# Patient Record
Sex: FEMALE | HISPANIC OR LATINO | Employment: UNEMPLOYED | URBAN - METROPOLITAN AREA
[De-identification: names, ages, dates, MRNs, and addresses within clinical notes are randomized per-mention and may not be internally consistent; named-entity substitution may affect disease eponyms.]

---

## 2023-08-07 ENCOUNTER — OFFICE VISIT (OUTPATIENT)
Dept: OBGYN CLINIC | Facility: CLINIC | Age: 23
End: 2023-08-07

## 2023-08-07 VITALS
SYSTOLIC BLOOD PRESSURE: 125 MMHG | DIASTOLIC BLOOD PRESSURE: 83 MMHG | BODY MASS INDEX: 32.74 KG/M2 | WEIGHT: 208.6 LBS | HEIGHT: 67 IN | HEART RATE: 95 BPM

## 2023-08-07 DIAGNOSIS — N92.6 MISSED MENSES: ICD-10-CM

## 2023-08-07 DIAGNOSIS — Z34.93 PRENATAL CARE IN THIRD TRIMESTER: Primary | ICD-10-CM

## 2023-08-07 DIAGNOSIS — Z3A.29 29 WEEKS GESTATION OF PREGNANCY: ICD-10-CM

## 2023-08-07 PROCEDURE — 99203 OFFICE O/P NEW LOW 30 MIN: CPT | Performed by: NURSE PRACTITIONER

## 2023-08-07 RX ORDER — VITAMIN A ACETATE, .BETA.-CAROTENE, ASCORBIC ACID, CHOLECALCIFEROL, .ALPHA.-TOCOPHEROL ACETATE, DL-, THIAMINE MONONITRATE, RIBOFLAVIN, NIACINAMIDE, PYRIDOXINE HYDROCHLORIDE, FOLIC ACID, CYANOCOBALAMIN, CALCIUM CARBONATE, FERROUS FUMARATE, ZINC OXIDE, CUPRIC OXIDE 9.9; 120; 920; 200; 400; 2; 12; 27; 1; 20; 10; 3; 1.84; 3080; 25 MG/1; MG/1; [IU]/1; MG/1; [IU]/1; MG/1; UG/1; MG/1; MG/1; MG/1; MG/1; MG/1; MG/1; [IU]/1; MG/1
1 TABLET, FILM COATED ORAL DAILY
Qty: 90 TABLET | Refills: 4 | Status: SHIPPED | OUTPATIENT
Start: 2023-08-07

## 2023-08-07 NOTE — PATIENT INSTRUCTIONS
Dong por garber confianza en nuestro equipo. Dannial Parish y agradecemos amelie comentarios. Si recibe everett encuesta nuestra, tómese unos momentos para informarnos cómo estamos. [address]       El Tercer Trimestre  (28-42 semanas)   GARBER BEBÉ   ·        garber bebé chupa garber dedo ahora! ·        garber bebé puede oír voces y responder al tacto. .. habla con Clemon See!!   ·        el cerebro de garber bebé crece y se desarrolla más en los últimos 2 meses de embarazo   ·        Arville Vogel y [place] vinicio del bebé son Demetra Gamma y flexibles para que quepan por el canal del parto   ·        los movimientos del bebé cambian hacia el final del embarazo porque hay menos espacio para patear y estiramientos en tu vientre   ·        los pulmones del bebé no están completamente desarrollados y totalmente preparados para respirar por amelie propios hasta el último 3-4 semanas antes de garber fecha de vencimiento     TU CUERPO   ·        garber vientre está creciendo mucho ahora   ·        será más difícil dormir esmer de noche o ser tan activos chico eres generalmente   ·        puede sudar más de lo habitual   ·        serás más desequilibrado. .. tenga cuidado de no caer! ·        Usted puede desarrollar hemorroides (qué pueden ser dolorosas y hacen difícil sentarse)   ·        los dos últimos meses del embarazo puede ser muy incómodos con wally de [place], wally de Arville Vogel y ardor de estómago   ·        Puedes empezar a tener contracciones. .. mientras son irregulares y [place] de 5 por hora, esto es everett parte normal de garber cuerpo a punto de tener un bebé   ·        el alicia uterino puede empezar a dilatar y abrir [place]. .. para estar listo para el parto   ·        Usted puede encontrarse que necesitan hacer orinar muy a menudo. .. porque el bebé está presionando mucho la vejiga   ·        Usted puede quedarse corta de respiracion más rápido de lo feliz          CUENTAS DEL RETROCESO FETAL    En el tercer trimestre (después de 28 semanas de gestación) deben realizar patada fetal cuentas cada día. [place] bebé debe moverse al menos 10 veces en 2 horas scotty un tiempo [place], everett vez al día. Elegir el atime del día cuando el bebé es [place]. Trate de hacerlo a la misma hora cada día. Entrar en everett posición cómoda y luego escribir el tiempo que tu bebé se mueve sherri. Cuenta cada movimiento hasta que el bebé se mueve 10 veces. Estos movimientos incluyen patadas, puñetazos, codazos, revolotea o rollos. [place] puede tardar entre 5 minutos a 2 horas. Anote el tiempo que sientes movimiento 10 del bebé. Si ha pasado 2 horas y tu bebé no se ha movido al menos 10 veces, usted debe llamar a la oficina de inmediato. [facility] a [phone]:  [place] que llamar inmediatamente si tengo incluso everett pequeña cantidad de LIQUIDO de mi vagina, con o sin contracciones. * Tengo que llamar si estoy SANGRADO de mi vagina. * Tengo que llamar si tengo COLICOS que continúa después de beber 2 ó 3 vasos de agua y [place] en mi lado scotty everett hora y que se siente chico que estoy teniendo un período. * Tengo que llamar si siento CONTRACCIONES más de 4 veces en everett hora quando siento que mi maryan se mantiene dura después de que trato de beber 2-3 vasos del agua y [place] en mi lado scotty everett hora. * Tengo que llamar si сергей un cambio de mi FLUJO vaginal.   * Tengo que llamar si siento la [place] que siente que el bebé aprieta en mi vagina y dura más de [place]. * Tengo que llamar si tengo DOLOR BAJO DE LA ESPALDA que es nueva y está cerca de mi cóccix. Puede entrar y salir varias veces scotty everett hora o quedarse allí constantemente. LA PRE-ECLAMPSIA    ¿Qué es? La preeclampsia es everett enfermedad grave que puede ocurrir scotty el embarazo se relaciona con la presión arterial heriberto. Le puede pasar a cualquier brandi. ¿Por qué Yes importarme?  [place] preeclampsia tienen riesgos graves pueden incluir convulsiones, trazo, daños [employer], nacimiento prematuro de madsen bebé. En los [employer] [place], puede causar la muerte de la madre y madsen bebé. ¿Qué jordy pagar Larance Lacey? Signos y síntomas de la preeclampsia pueden incluir:   * Inflamación severa dimple de la roxann o las   * Dolor de teodoro todavía presente después de josey tomado Tylenol   * Effie manchas o cambios en [place]   * Dolor en la parte superior del abdomen o hombro   * [place] náuseas y vómitos (en la segunda mitad del embarazo)   * Aumento de peso repentino   * Dificultad para respirar     ¿Qué jordy hacer? Si usted experimenta alguno de los síntomas de la preeclampsia, comuníquese con madsen proveedor de [place]. Encontrar la preeclampsia temprana es importante para usted y madsen bebé. Elpidio nicole [phone].           LACTANCIA MATERNA     BENEFICIOS PARA LOS BEBÉS   ·       sistemas inmunológicos más marely (menos alergias, eczema, asma y cáncer infantil)   ·       menos diarrea y estreñimiento u otras enfermedades GI   ·       menos resfriados e infecciones del oído   ·       mejor visión y dientes (menos cavidades)   ·       mejora el IQ   ·       menores tasas de diabetes y obesidad en la infancia     BENEFICIOS PARA LAS MADRES   ·        promueve la pérdida de peso más rápida después del parto   ·        daina riesgo para la depresión postparto   ·        daina riesgo para los cánceres de [place], útero y ovario   ·        daina riesgo para la osteoporosis en desarrollo con la edad   ·        siempre es más fácil que fórmula - correcto, limpio, y la temperatura adecuada   ·        menos costosa que la fórmula es gratis!!!     CLAVES PARA SHONNA LACTANCIA EXITOSA   ·        mantener bebé piel a piel hasta después de la primera alimentación evento   ·        tener a bebé en madsen cuarto con usted scotty madsen estadía en el hospital después del parto   ·        evitar cualquier botella de alimentación (a menos que sea médicamente necesario)   ·        limitar el uso de chupones y pañales   ·        Pida ayuda si usted está teniendo problemas. .. consultores de lactancia (que se especializan en la lactancia) están disponibles para ayudarle a   ·        everett dieta saludable para mamá. .. comiendo everett variedad de alimentos y raciones con moderación     COSAS QUE DEBES SABER SOBRE LA LACTANCIA   ·        mayoría de los medicamentos se considera compatible con la lactancia materna por [address] [place] consultarlo con madsen médico o en lactancia antes de elinor un medicamento nuevo. .. para estar seguro es seguro   ·        alcohol (cerveza, vino, licor) puede transmitirse de la madre al bebé a través de la [place]. .. un ocasional, social bebida es considerado aceptable por [facility]. .. más que eso deben evitarse   ·        la lactancia materna es NO es un método para evitar embarazo   ·        nicotina (cigarrillos) puede pasar de la madre al bebé a través de la [place]. .. sin embargo, para las [employer], es aún más saludable para amamantar que use la fórmula   ·        cafeína debería limitarse a 1-3 tazas por día. .. incluye café, refrescos, bebidas energéticas           MASAJE EN LA CLEM PERINEAL O VAGINAL    Que puedo hacer ahora para reducir las probabilidades de desgarro scotty el parto? Masaje alrededor del orificio de la vagina realizado por usted misma (o por madsen ty). El masaje en esta clem, ya sea antes del parto o scotty la segunda etapa del parto, [place] reducir la probabilidad de desgarro perineal scotty el parto. Asimismo, el uso de compresas tibias en el perineo scotty la etapa expulsiva del parto puede reducir la pravedad del desparro. [place] sucedera scotty la etapa expulsiva del parto. En casa tambien puede reducir las probabilidades de sufrir lesiones durnate el parto de con masajes en la clem perineal.    Lito Kimble?   [address] [place] [address] embarazadas a partir de, [address], las 34 semanas de [place]. Cuando? Usted o madsen ty deben hacer masajes en la yeimy vaginal marielena 5 a 10 minutos de 1 a 4 veces por semana. Tuan? Use everett mezcla de agua y aceite de [place], kumar u shields con 1 o 2 dedos (lady la comodidad). Inserte los dedos en la vagina entre 3 y 5cm. Aplique presion general hacia abajo y [employer] costados marielena 5 a [address] 3 y las 9 horas, de 1 a 4 veces por semana. SENALES MARIELENA EL EMBARAZO  Llame a nuestra oficina al [phone] para cualquiera de los siguientes:    1. Sangrado vaginal  2. Dolor abdominal dolores que no desaparece. 3. Fiebre (más de 100.4 y no se cyrus con Tylenol)  4. Vómitos persistentes que case más de 24 horas. 5. dolor de pecho  6. Dolor o ardor al orinar. 7. Dolor de teodoro severo que no se resuelve con Tylenol  8. Visión borrosa o tre puntos en madsen visión. 9. Hinchazón repentina de madsen roxann o dimple. 10. Enrojecimiento, hinchazón o dolor en everett pierna. 11. Un aumento de peso repentino en pocos días. 12. Contar los movimientos fetales del bebé. (después de 28 semanas o el sexto mes de embarazo)  15. Eve[place] pérdida de líquido acuoso de la vagina: puede ser un chorro, un goteo o everett humedad continua  14.  Después de 20 semanas de embarazo, calambres rítmicos (más de 4 por hora) o menstruales tuan dolor Raymond Balamanda / Hortencia Prier

## 2023-08-07 NOTE — PROGRESS NOTES
PROBLEM GYNECOLOGICAL VISIT    Alexi Schneider is a 21 y.o. female who presents today with complaint of pregnancy. Her general medical history has been reviewed and she reports it as follows:    Past Medical History:   Diagnosis Date   • No known health problems      Past Surgical History:   Procedure Laterality Date   • NO PAST SURGERIES       OB History        4    Para   2    Term   2       0    AB   1    Living   2       SAB   1    IAB   0    Ectopic   0    Multiple   0    Live Births   2               Social History     Tobacco Use   • Smoking status: Never   • Smokeless tobacco: Never   Vaping Use   • Vaping Use: Never used   Substance Use Topics   • Alcohol use: Not Currently     Comment: couple times/month, none since pregnant   • Drug use: Never     Social History     Substance and Sexual Activity   Sexual Activity Yes   • Partners: Male   • Birth control/protection: None       Current Outpatient Medications   Medication Instructions   • Prenatal Vit-Fe Fumarate-FA (Prenatal Plus Vitamin/Mineral) 27-1 MG TABS 1 tablet, Oral, Daily       History of Present Illness:   Reports LMP of 2023. States she has just relocated here from the [facility] where she has had no prenatal care so far. She denies any vaginal bleeding since 2023. Denies any abdominal/pelvic pain or uterine contractions. Denies vaginal leaking of fluid. Reports much fetal movement. States she went to [address] [place] [address] earlier today to start prenatal care and they did an ultrasound and bloodwork and told her she is 29 weeks pregnant but she cancelled all subsequent care with them once she heard about our program from her friend and came directly here to initiate prenatal care. She brings documentation that she was given Tdap at [address] [place] [address] today. Review of Systems:  Review of Systems   Constitutional: Negative. Gastrointestinal: Negative.     Genitourinary: Negative for pelvic pain, vaginal bleeding, vaginal discharge and vaginal pain. + fetal movement       Physical Exam:  /83   Pulse 95   Ht 5' 7" (1.702 m)   Wt 94.6 kg (208 lb 9.6 oz)   LMP 01/13/2023 (Exact Date)   BMI 32.67 kg/m²   Physical Exam  Constitutional:       General: She is not in acute distress. Abdominal:      Palpations: Abdomen is soft. Tenderness: There is no abdominal tenderness. Comments: Gravid uterus with fundal ht=31cm and palpable gross fetal movement and FHR via doppler of 128   Neurological:      Mental Status: She is alert. Skin:     General: Skin is warm and dry. Vitals reviewed. Assessment:   1. Pregnancy @29w3d. Plan:   1. Referral ordered for MFM consultation/ultrasound to confirm dating and perform fetal anatomy screen. 2. Bloodwork ordered: prenatal labwork. 3. Return to office within 1 week for OB intake visit. 4. Patient's depression screening was assessed with a PHQ-2 score of 1. Their PHQ-9 score was 3. Clinically patient does not have depression. No treatment is required.

## 2023-08-08 ENCOUNTER — APPOINTMENT (OUTPATIENT)
Dept: LAB | Facility: HOSPITAL | Age: 23
End: 2023-08-08

## 2023-08-08 DIAGNOSIS — Z34.93 PRENATAL CARE IN THIRD TRIMESTER: ICD-10-CM

## 2023-08-08 PROBLEM — O99.019 MATERNAL IRON DEFICIENCY ANEMIA COMPLICATING PREGNANCY: Status: ACTIVE | Noted: 2023-08-08

## 2023-08-08 PROBLEM — D50.9 MATERNAL IRON DEFICIENCY ANEMIA COMPLICATING PREGNANCY: Status: ACTIVE | Noted: 2023-08-08

## 2023-08-08 LAB
ABO GROUP BLD: NORMAL
BACTERIA UR QL AUTO: ABNORMAL /HPF
BASOPHILS # BLD AUTO: 0.02 THOUSANDS/ÂΜL (ref 0–0.1)
BASOPHILS NFR BLD AUTO: 0 % (ref 0–1)
BILIRUB UR QL STRIP: NEGATIVE
BLD GP AB SCN SERPL QL: NEGATIVE
CLARITY UR: ABNORMAL
COLOR UR: ABNORMAL
EOSINOPHIL # BLD AUTO: 0.24 THOUSAND/ÂΜL (ref 0–0.61)
EOSINOPHIL NFR BLD AUTO: 3 % (ref 0–6)
ERYTHROCYTE [DISTWIDTH] IN BLOOD BY AUTOMATED COUNT: 12.5 % (ref 11.6–15.1)
GLUCOSE 1H P 50 G GLC PO SERPL-MCNC: 129 MG/DL (ref 40–134)
GLUCOSE UR STRIP-MCNC: NEGATIVE MG/DL
HCT VFR BLD AUTO: 33.3 % (ref 34.8–46.1)
HGB BLD-MCNC: 10.7 G/DL (ref 11.5–15.4)
HGB UR QL STRIP.AUTO: NEGATIVE
IMM GRANULOCYTES # BLD AUTO: 0.09 THOUSAND/UL (ref 0–0.2)
IMM GRANULOCYTES NFR BLD AUTO: 1 % (ref 0–2)
KETONES UR STRIP-MCNC: NEGATIVE MG/DL
LEUKOCYTE ESTERASE UR QL STRIP: 500
LYMPHOCYTES # BLD AUTO: 1.59 THOUSANDS/ÂΜL (ref 0.6–4.47)
LYMPHOCYTES NFR BLD AUTO: 17 % (ref 14–44)
MCH RBC QN AUTO: 28.9 PG (ref 26.8–34.3)
MCHC RBC AUTO-ENTMCNC: 32.1 G/DL (ref 31.4–37.4)
MCV RBC AUTO: 90 FL (ref 82–98)
MONOCYTES # BLD AUTO: 0.43 THOUSAND/ÂΜL (ref 0.17–1.22)
MONOCYTES NFR BLD AUTO: 5 % (ref 4–12)
NEUTROPHILS # BLD AUTO: 6.88 THOUSANDS/ÂΜL (ref 1.85–7.62)
NEUTS SEG NFR BLD AUTO: 74 % (ref 43–75)
NITRITE UR QL STRIP: NEGATIVE
NON-SQ EPI CELLS URNS QL MICRO: ABNORMAL /HPF
NRBC BLD AUTO-RTO: 0 /100 WBCS
PH UR STRIP.AUTO: 7 [PH]
PLATELET # BLD AUTO: 289 THOUSANDS/UL (ref 149–390)
PMV BLD AUTO: 10 FL (ref 8.9–12.7)
PROT UR STRIP-MCNC: NEGATIVE MG/DL
RBC # BLD AUTO: 3.7 MILLION/UL (ref 3.81–5.12)
RBC #/AREA URNS AUTO: ABNORMAL /HPF
RH BLD: POSITIVE
RUBV IGG SERPL IA-ACNC: 106.1 IU/ML
SP GR UR STRIP.AUTO: 1.01 (ref 1–1.04)
SPECIMEN EXPIRATION DATE: NORMAL
UROBILINOGEN UA: NEGATIVE MG/DL
WBC # BLD AUTO: 9.25 THOUSAND/UL (ref 4.31–10.16)
WBC #/AREA URNS AUTO: ABNORMAL /HPF

## 2023-08-08 PROCEDURE — 86901 BLOOD TYPING SEROLOGIC RH(D): CPT

## 2023-08-08 PROCEDURE — 86706 HEP B SURFACE ANTIBODY: CPT

## 2023-08-08 PROCEDURE — 86780 TREPONEMA PALLIDUM: CPT

## 2023-08-08 PROCEDURE — 85025 COMPLETE CBC W/AUTO DIFF WBC: CPT

## 2023-08-08 PROCEDURE — 86803 HEPATITIS C AB TEST: CPT

## 2023-08-08 PROCEDURE — 81001 URINALYSIS AUTO W/SCOPE: CPT | Performed by: NURSE PRACTITIONER

## 2023-08-08 PROCEDURE — 87340 HEPATITIS B SURFACE AG IA: CPT

## 2023-08-08 PROCEDURE — 83020 HEMOGLOBIN ELECTROPHORESIS: CPT

## 2023-08-08 PROCEDURE — 86850 RBC ANTIBODY SCREEN: CPT

## 2023-08-08 PROCEDURE — 86900 BLOOD TYPING SEROLOGIC ABO: CPT

## 2023-08-08 PROCEDURE — 87086 URINE CULTURE/COLONY COUNT: CPT

## 2023-08-08 PROCEDURE — 36415 COLL VENOUS BLD VENIPUNCTURE: CPT

## 2023-08-08 PROCEDURE — 82950 GLUCOSE TEST: CPT

## 2023-08-08 PROCEDURE — 87389 HIV-1 AG W/HIV-1&-2 AB AG IA: CPT

## 2023-08-08 PROCEDURE — 86762 RUBELLA ANTIBODY: CPT

## 2023-08-09 ENCOUNTER — INITIAL PRENATAL (OUTPATIENT)
Dept: OBGYN CLINIC | Facility: CLINIC | Age: 23
End: 2023-08-09

## 2023-08-09 VITALS
DIASTOLIC BLOOD PRESSURE: 74 MMHG | HEIGHT: 67 IN | BODY MASS INDEX: 33.09 KG/M2 | SYSTOLIC BLOOD PRESSURE: 121 MMHG | WEIGHT: 210.8 LBS | HEART RATE: 82 BPM

## 2023-08-09 DIAGNOSIS — Z34.93 PRENATAL CARE IN THIRD TRIMESTER: Primary | ICD-10-CM

## 2023-08-09 DIAGNOSIS — Z31.430 ENCOUNTER OF FEMALE FOR TESTING FOR GENETIC DISEASE CARRIER STATUS FOR PROCREATIVE MANAGEMENT: ICD-10-CM

## 2023-08-09 DIAGNOSIS — Z3A.29 29 WEEKS GESTATION OF PREGNANCY: ICD-10-CM

## 2023-08-09 LAB
DME PARACHUTE DELIVERY DATE REQUESTED: NORMAL
DME PARACHUTE ITEM DESCRIPTION: NORMAL
DME PARACHUTE ORDER STATUS: NORMAL
DME PARACHUTE SUPPLIER NAME: NORMAL
DME PARACHUTE SUPPLIER PHONE: NORMAL
HBV SURFACE AB SER-ACNC: 3.57 MIU/ML
HBV SURFACE AG SER QL: NORMAL
HCV AB SER QL: NORMAL
HIV 1+2 AB+HIV1 P24 AG SERPL QL IA: NORMAL
HIV 2 AB SERPL QL IA: NORMAL
HIV1 AB SERPL QL IA: NORMAL
HIV1 P24 AG SERPL QL IA: NORMAL
TREPONEMA PALLIDUM IGG+IGM AB [PRESENCE] IN SERUM OR PLASMA BY IMMUNOASSAY: NORMAL

## 2023-08-09 PROCEDURE — 99211 OFF/OP EST MAY X REQ PHY/QHP: CPT

## 2023-08-09 NOTE — LETTER
8/9/2023      This letter is to confirm that Wilhemina Severance is pregnant and is under our care. Her Estimated Date of Delivery: 10/20/23. If you have any questions or concerns, please contact our office.   Thank you,    AVE Ornelas

## 2023-08-09 NOTE — PATIENT INSTRUCTIONS
SENALES MARIELENA EL EMBARAZO  Llame a nuestra oficina al [phone] para cualquiera de los siguientes:    1. sangrado vaginal  2. Dolor abdominal dolores que no desaparece. 3. Fiebre (más de 100.4 y no se cyrus con Tylenol)  4. Vómitos persistentes que case más de 24 horas. 5. dolor de pecho  6. Dolor o ardor al orinar. 7. Dolor de teodoro severo que no se resuelve con Tylenol  8. Visión borrosa o tre puntos en madsen visión. 9. Hinchazón repentina de madsen roxann o dimple. 10. Enrojecimiento, hinchazón o dolor en everett pierna. 11. Un aumento de peso repentino en pocos días. 12. Contar los movimientos fetales del bebé. (después de 28 semanas o el sexto mes de embarazo)  15. Eve[place] pérdida de líquido acuoso de la vagina: puede ser un chorro, un goteo o everett humedad continua  14.  Después de 20 semanas de embarazo, calambres rítmicos (más de 4 por hora) o menstruales chico dolor Anson Fore / Frances Fruits

## 2023-08-09 NOTE — PROGRESS NOTES
OBSTETRIC INTAKE VISIT    Liliam Olivas presents today for initial OB visit and intake at 29w5d. LMP - 23. Pt a transfer from [facility]. 28 week teaching done. Tdap already given. Breast pump ordered. History obtained from patient and she reports it as follows:    Past Medical History:   Diagnosis Date   • Asthma     as a kid     Past Surgical History:   Procedure Laterality Date   • NO PAST SURGERIES       OB History    Para Term  AB Living   4 2 2 0 1 2   SAB IAB Ectopic Multiple Live Births   1 0 0 0 2      # Outcome Date GA Lbr Mario Alberto/2nd Weight Sex Delivery Anes PTL Lv   4 Current            3 Term 20 37w0d  2268 g (5 lb) F Vag-Spont   QUYEN      Birth Comments: FOB3      Complications: IUGR (intrauterine growth restriction) affecting care of mother   2 SAB      SAB         Birth Comments: FOB2   1 Term 10/01/15 38w0d  3175 g (7 lb) F Vag-Spont None N QUYEN      Birth Comments: FOB1     Social History     Tobacco Use   • Smoking status: Never   • Smokeless tobacco: Never   Vaping Use   • Vaping Use: Never used   Substance Use Topics   • Alcohol use: Not Currently     Comment: couple times/month, none since pregnant   • Drug use: Never       Current Outpatient Medications   Medication Instructions   • Prenatal Vit-Fe Fumarate-FA (Prenatal Plus Vitamin/Mineral) 27-1 MG TABS 1 tablet, Oral, Daily       No Known Allergies    Vitals: /74 (BP Location: Right arm, Patient Position: Sitting, Cuff Size: Standard)   Pulse 82   Ht 5' 7" (1.702 m)   Wt 95.6 kg (210 lb 12.8 oz)   LMP 2023 (Exact Date)   BMI 33.02 kg/m²     Review of Systems:  Denies vaginal bleeding or leaking fluid. Denies abdominal/pelvic pain or contractions. Plan:  1. OB labwork ordered today to include Prenatal Carrier Screen Panel. Advised patient the lab will call them when it gets approved. 2. Next ultrasound is scheduled for 23. 3. Return 23 for H&P prenatal visit.   4. Referrals placed for [facility], , and [facility]. 5. Given vaccines: None due. 6. Patient's depression screening was assessed with a PHQ-2 score of 0. Their PHQ-9 score was 0. Clinically patient does not have depression. No treatment is required.    will see pt at her OB H&P.  7. Reviewed the following educational topics with patient:   -routine prenatal visit/ultrasound/labwork schedule   -hospital for delivery and office phone/answering service contact information   -nutritional demands of pregnancy, healthy dietary habits   -listeria, toxoplasmosis, seafood precautions   -weight gain expectations (based on pre-pregnant BMI)   -exercise, rest, and sexual activity during pregnancy   -abstinence from alcohol, tobacco, and illegal drugs   -common discomforts of pregnancy and appropriate management   -OTC medications safe to use in pregnancy   -genetic screening options   -vaccines in pregnancy   -symptoms to report to OB provider    -signs of PTL and pre-eclampsia    -vaginal bleeding/leaking of fluid    -severe n/v-unable to tolerate ANY food/fluids for more than 24 hours

## 2023-08-10 PROBLEM — Z78.9 NOT IMMUNE TO HEPATITIS B VIRUS: Status: ACTIVE | Noted: 2023-08-10

## 2023-08-10 LAB
BACTERIA UR CULT: NORMAL
HGB A MFR BLD: 3 % (ref 1.8–3.2)
HGB A MFR BLD: 97 % (ref 96.4–98.8)
HGB F MFR BLD: 0 % (ref 0–2)
HGB FRACT BLD-IMP: NORMAL
HGB S MFR BLD: 0 %

## 2023-08-22 ENCOUNTER — HOSPITAL ENCOUNTER (INPATIENT)
Facility: HOSPITAL | Age: 23
LOS: 2 days | Discharge: HOME | End: 2023-08-24
Attending: OBSTETRICS & GYNECOLOGY | Admitting: OBSTETRICS & GYNECOLOGY
Payer: COMMERCIAL

## 2023-08-22 ENCOUNTER — INITIAL PRENATAL (OUTPATIENT)
Dept: OBGYN CLINIC | Facility: CLINIC | Age: 23
End: 2023-08-22

## 2023-08-22 VITALS
SYSTOLIC BLOOD PRESSURE: 115 MMHG | DIASTOLIC BLOOD PRESSURE: 57 MMHG | WEIGHT: 211.8 LBS | HEART RATE: 99 BPM | BODY MASS INDEX: 33.17 KG/M2

## 2023-08-22 DIAGNOSIS — O99.013 MATERNAL IRON DEFICIENCY ANEMIA COMPLICATING PREGNANCY IN THIRD TRIMESTER: ICD-10-CM

## 2023-08-22 DIAGNOSIS — O60.00 PRETERM LABOR: Primary | ICD-10-CM

## 2023-08-22 DIAGNOSIS — Z34.93 PRENATAL CARE IN THIRD TRIMESTER: Primary | ICD-10-CM

## 2023-08-22 DIAGNOSIS — D50.9 MATERNAL IRON DEFICIENCY ANEMIA COMPLICATING PREGNANCY IN THIRD TRIMESTER: ICD-10-CM

## 2023-08-22 DIAGNOSIS — Z3A.31 31 WEEKS GESTATION OF PREGNANCY: ICD-10-CM

## 2023-08-22 LAB
ABO GROUP BLD: NORMAL
BLD GP AB SCN SERPL QL: NEGATIVE
ERYTHROCYTE [DISTWIDTH] IN BLOOD BY AUTOMATED COUNT: 12.7 % (ref 11.6–15.1)
HCT VFR BLD AUTO: 35.2 % (ref 34.8–46.1)
HGB BLD-MCNC: 11.3 G/DL (ref 11.5–15.4)
HOLD SPECIMEN: NORMAL
MCH RBC QN AUTO: 28.7 PG (ref 26.8–34.3)
MCHC RBC AUTO-ENTMCNC: 32.1 G/DL (ref 31.4–37.4)
MCV RBC AUTO: 89 FL (ref 82–98)
PLATELET # BLD AUTO: 288 THOUSANDS/UL (ref 149–390)
PMV BLD AUTO: 9.7 FL (ref 8.9–12.7)
RBC # BLD AUTO: 3.94 MILLION/UL (ref 3.81–5.12)
RH BLD: POSITIVE
SPECIMEN EXPIRATION DATE: NORMAL
WBC # BLD AUTO: 10.74 THOUSAND/UL (ref 4.31–10.16)

## 2023-08-22 PROCEDURE — 87150 DNA/RNA AMPLIFIED PROBE: CPT | Performed by: OBSTETRICS & GYNECOLOGY

## 2023-08-22 PROCEDURE — 85027 COMPLETE CBC AUTOMATED: CPT | Performed by: OBSTETRICS & GYNECOLOGY

## 2023-08-22 PROCEDURE — 86850 RBC ANTIBODY SCREEN: CPT | Performed by: OBSTETRICS & GYNECOLOGY

## 2023-08-22 PROCEDURE — 99213 OFFICE O/P EST LOW 20 MIN: CPT | Performed by: NURSE PRACTITIONER

## 2023-08-22 PROCEDURE — 86900 BLOOD TYPING SEROLOGIC ABO: CPT | Performed by: OBSTETRICS & GYNECOLOGY

## 2023-08-22 PROCEDURE — 87491 CHLMYD TRACH DNA AMP PROBE: CPT | Performed by: OBSTETRICS & GYNECOLOGY

## 2023-08-22 PROCEDURE — 99222 1ST HOSP IP/OBS MODERATE 55: CPT | Performed by: OBSTETRICS & GYNECOLOGY

## 2023-08-22 PROCEDURE — 86780 TREPONEMA PALLIDUM: CPT | Performed by: OBSTETRICS & GYNECOLOGY

## 2023-08-22 PROCEDURE — 87591 N.GONORRHOEAE DNA AMP PROB: CPT | Performed by: OBSTETRICS & GYNECOLOGY

## 2023-08-22 PROCEDURE — 76805 OB US >/= 14 WKS SNGL FETUS: CPT | Performed by: OBSTETRICS & GYNECOLOGY

## 2023-08-22 PROCEDURE — 76815 OB US LIMITED FETUS(S): CPT

## 2023-08-22 PROCEDURE — 86901 BLOOD TYPING SEROLOGIC RH(D): CPT | Performed by: OBSTETRICS & GYNECOLOGY

## 2023-08-22 PROCEDURE — 99213 OFFICE O/P EST LOW 20 MIN: CPT

## 2023-08-22 RX ORDER — MAGNESIUM SULFATE HEPTAHYDRATE 40 MG/ML
4 INJECTION, SOLUTION INTRAVENOUS ONCE
Status: COMPLETED | OUTPATIENT
Start: 2023-08-22 | End: 2023-08-22

## 2023-08-22 RX ORDER — MAGNESIUM SULFATE HEPTAHYDRATE 40 MG/ML
2 INJECTION, SOLUTION INTRAVENOUS ONCE
Status: COMPLETED | OUTPATIENT
Start: 2023-08-22 | End: 2023-08-22

## 2023-08-22 RX ORDER — ACETAMINOPHEN 325 MG/1
650 TABLET ORAL EVERY 4 HOURS PRN
Status: DISCONTINUED | OUTPATIENT
Start: 2023-08-22 | End: 2023-08-24 | Stop reason: HOSPADM

## 2023-08-22 RX ORDER — ONDANSETRON 2 MG/ML
4 INJECTION INTRAMUSCULAR; INTRAVENOUS EVERY 8 HOURS PRN
Status: DISCONTINUED | OUTPATIENT
Start: 2023-08-22 | End: 2023-08-24 | Stop reason: HOSPADM

## 2023-08-22 RX ORDER — BETAMETHASONE SODIUM PHOSPHATE AND BETAMETHASONE ACETATE 3; 3 MG/ML; MG/ML
12 INJECTION, SUSPENSION INTRA-ARTICULAR; INTRALESIONAL; INTRAMUSCULAR; SOFT TISSUE EVERY 24 HOURS
Status: COMPLETED | OUTPATIENT
Start: 2023-08-22 | End: 2023-08-23

## 2023-08-22 RX ORDER — FERROUS SULFATE TAB EC 324 MG (65 MG FE EQUIVALENT) 324 (65 FE) MG
324 TABLET DELAYED RESPONSE ORAL
Qty: 30 TABLET | Refills: 5 | Status: SHIPPED | OUTPATIENT
Start: 2023-08-22

## 2023-08-22 RX ORDER — SODIUM CHLORIDE, SODIUM LACTATE, POTASSIUM CHLORIDE, CALCIUM CHLORIDE 600; 310; 30; 20 MG/100ML; MG/100ML; MG/100ML; MG/100ML
125 INJECTION, SOLUTION INTRAVENOUS CONTINUOUS
Status: DISCONTINUED | OUTPATIENT
Start: 2023-08-22 | End: 2023-08-24 | Stop reason: HOSPADM

## 2023-08-22 RX ORDER — MAGNESIUM SULFATE HEPTAHYDRATE 40 MG/ML
2 INJECTION, SOLUTION INTRAVENOUS CONTINUOUS
Status: DISCONTINUED | OUTPATIENT
Start: 2023-08-22 | End: 2023-08-23

## 2023-08-22 RX ORDER — CALCIUM GLUCONATE 94 MG/ML
1 INJECTION, SOLUTION INTRAVENOUS ONCE AS NEEDED
Status: DISCONTINUED | OUTPATIENT
Start: 2023-08-22 | End: 2023-08-24 | Stop reason: HOSPADM

## 2023-08-22 RX ADMIN — SODIUM CHLORIDE 2.5 MILLION UNITS: 9 INJECTION, SOLUTION INTRAVENOUS at 23:42

## 2023-08-22 RX ADMIN — SODIUM CHLORIDE, SODIUM LACTATE, POTASSIUM CHLORIDE, AND CALCIUM CHLORIDE 1000 ML: .6; .31; .03; .02 INJECTION, SOLUTION INTRAVENOUS at 20:08

## 2023-08-22 RX ADMIN — SODIUM CHLORIDE, SODIUM LACTATE, POTASSIUM CHLORIDE, AND CALCIUM CHLORIDE 50 ML/HR: .6; .31; .03; .02 INJECTION, SOLUTION INTRAVENOUS at 21:42

## 2023-08-22 RX ADMIN — MAGNESIUM SULFATE HEPTAHYDRATE 4 G: 40 INJECTION, SOLUTION INTRAVENOUS at 19:48

## 2023-08-22 RX ADMIN — SODIUM CHLORIDE 5 MILLION UNITS: 0.9 INJECTION, SOLUTION INTRAVENOUS at 19:54

## 2023-08-22 RX ADMIN — MAGNESIUM SULFATE IN WATER 2 G/HR: 20 INJECTION, SOLUTION INTRAVENOUS at 20:19

## 2023-08-22 RX ADMIN — MAGNESIUM SULFATE HEPTAHYDRATE 2 G: 40 INJECTION, SOLUTION INTRAVENOUS at 20:06

## 2023-08-22 RX ADMIN — BETAMETHASONE SODIUM PHOSPHATE AND BETAMETHASONE ACETATE 12 MG: 3; 3 INJECTION, SUSPENSION INTRA-ARTICULAR; INTRALESIONAL; INTRAMUSCULAR at 19:52

## 2023-08-22 NOTE — ASSESSMENT & PLAN NOTE
Assessment:   21 y.o. [id number] at [address] who presents with vaginal bleeding and contractions, admitted for  labor    Plan:   1. IUP at 31w4d by LMP, no 1st trimester ultrasound, measuring 28w2d by unofficial 3rd trim US   - Continuous FHR and tocometry monitoring   - fetus vertex   - MFM to perform growth scan tomorrow  . Membrane status: intact  .  labor assessment   - SSE: cervix not visibly dilated, no bleeding or lesions, white discharge from cervical os   - FFN: pending   - SVE: 4.5/60/-2   - CL 1.58-2.09cm on TVUS   - Infection workup    - GBS: pending    - Gonorrhea/chlamydia: negative    - Urine analysis/culture: pending  .  labor management   - s/p 2 doses BTM for FLM   - s/p magnesium    - s/p Penicillin  . Future contraception in the event of an emergency   - Pt desires permanent sterilization if CS, however she does not have insurance   . Routine antepartum labs   - CBC, RPR, T&S stat  . MFM consult   - dating by LMP   - NST TID  . NICU consult placed  .  FEN   - Regular, IV LR for hydration

## 2023-08-22 NOTE — PROGRESS NOTES
L&D Triage Note - OB/GYN  Digmary RiveraReyes 21 y.o. female MRN: [id number]  Unit/Bed#: LD TRIAGE  Encounter: [id number]    Patient is seen by ***    ASSESSMENT  Joceline Wolfe is a 21 y.o. [id number] at 31w4d who presents to triage with increased vaginal bleeding this morning from 10 previous days. PLAN  #1. Vaginal Bleeding: Rule-out  labor  · CBC  · Fetal fibronectin  · Transvaginal ultrasound  · Urinalysis     #***. Discharge instructions  · Patient instructed to call if experiencing worsening contractions, vaginal bleeding, loss of fluid or decreased fetal movement. · Will follow up with OBGYN on ***. D/w Dr. Ciarra Alejo  ______________    SUBJECTIVE    AVILA: Estimated Date of Delivery: 10/20/23    HPI:  21 y.o. [id number] 31w4d presents with complaint of pregnancy with vaginal bleeding on-and-off for the past 10 days. She noticed more vaginal bleeding this morning, which prompted her to present to triage. Contractions: No.  Leakage of fluid: No.  Vaginal Bleeding: Yes. Fetal movement: present: Yes. Her current obstetrical history is significant for late presentation to care at 29w. Recent relocation from [place]:  Constitutional: Patient reports some back pain that she has noted with previous pregnancies. She denies fevers, chills, nausea, vomiting, diarrhea, or burning on urination. Respiratory: Negative. Patient denies any shortness of breath or other difficulties breathing. Cardiovascular: Negative. Patient denies any palpitations or chest pain. Gastrointestinal: Patient reports some lower abdominal pain that is similar to her experience with previous pregnancies. OBJECTIVE:  /80   Pulse 77   Temp 98.7 °F (37.1 °C) (Oral)   Resp 16   LMP 2023 (Exact Date)   There is no height or weight on file to calculate BMI. Physical Exam  Constitutional:       Appearance: Normal appearance. HENT:      Head: Normocephalic.    Eyes:      Extraocular Movements: Extraocular movements intact. Conjunctiva/sclera: Conjunctivae normal.      Pupils: Pupils are equal, round, and reactive to light. Cardiovascular:      Rate and Rhythm: Normal rate and regular rhythm. Pulses: Normal pulses. Pulmonary:      Effort: Pulmonary effort is normal.   Skin:     General: Skin is warm and dry. Neurological:      Mental Status: She is alert. Psychiatric:         Mood and Affect: Mood normal.         Behavior: Behavior normal.         Thought Content:  Thought content normal.         Judgment: Judgment normal.         Speculum exam: ***    KOH/Wet [place]:     Infection:   - *** clue cells    - *** hyphae   - *** trichomonads present    Membrane status   - *** ferning   - *** nitrazene   - *** pooling     SVE:       FHT:       TOCO:        IMAGING:       TVUS   Cervical length         - ***cm         - ***cm         - ***cm   Presentation: ***        TAUS   GAIL      - Q1 ***cm     - Q2 ***cm     - Q3 ***cm     - Q4 ***cm     - Total: ***cm   Placenta: ***   Presentation: ***    Labs: No results found for this or any previous visit (from the past 24 hour(s)).      8/22/2023  3:28 PM

## 2023-08-22 NOTE — PROCEDURES
Micaela Nolasco, a [id number] at 31w4d with an AVILA of 10/20/2023, by Last Menstrual Period, was seen at [address] [place] [address] for the following procedure(s): $Procedure Type: GAIL, US - abdominal]         4 Quadrant GAIL  LVP (cm): 41.7 cm         Ultrasound Other  Fetal Presentation: Vertex  Cervical Length: 2.09  Funnel: No  Debris: No  Placenta Previa: No  Vasa Previa: No                 Newville Fraction, MD  Obstetrics & Gynecology, PGY2

## 2023-08-22 NOTE — PATIENT INSTRUCTIONS
Dong por garber confianza en nuestro equipo. Meredeth Romero y agradecemos amelie comentarios. Si recibe everett encuesta nuestra, tómese unos momentos para informarnos cómo estamos. A[address]       El Tercer Trimestre  (28-42 semanas)   GARBER BEBÉ   ·        garber bebé chupa garber dedo ahora! ·        garber bebé puede oír voces y responder al tacto. .. habla con [place]!!   ·        el cerebro de garber bebé crece y se desarrolla más en los últimos 2 meses de embarazo   ·        [place] y [place] del bebé son [place] Pale y flexibles para que quepan por el canal del parto   ·        los movimientos del bebé cambian hacia el final del embarazo porque hay menos espacio para patear y estiramientos en tu vientre   ·        los pulmones del bebé no están completamente desarrollados y totalmente preparados para respirar por amelie propios hasta el último 3-4 semanas antes de garber fecha de vencimiento     TU CUERPO   ·        garber vientre está creciendo mucho ahora   ·        será más difícil dormir esmer de noche o ser tan activos chico eres generalmente   ·        puede sudar más de lo habitual   ·        serás más desequilibrado. .. tenga cuidado de no caer! ·        Usted puede desarrollar hemorroides (qué pueden ser dolorosas y hacen difícil sentarse)   ·        los dos últimos meses del embarazo puede ser muy incómodos con wally de [place], wally de [place] y ardor de estómago   ·        Puedes empezar a tener contracciones. .. mientras son irregulares y [place] de 5 por hora, esto es everett parte normal de garbre cuerpo a punto de tener un bebé   ·        el alicia uterino puede empezar a dilatar y abrir [place]. .. para estar listo para el parto   ·        Usted puede encontrarse que necesitan hacer orinar muy a menudo. .. porque el bebé está presionando mucho la vejiga   ·        Usted puede quedarse corta de respiracion más rápido de lo feliz          CUENTAS DEL RETROCESO FETAL    En el tercer trimestre (después de 28 semanas de gestación) deben realizar patada fetal cuentas cada día. [place] bebé debe moverse al menos 10 veces en 2 horas scotty un tiempo [place], everett vez al día. Elegir el atime del día cuando el bebé es [place]. Trate de hacerlo a la misma hora cada día. Entrar en everett posición cómoda y luego escribir el tiempo que tu bebé se mueve sherri. Cuenta cada movimiento hasta que el bebé se mueve 10 veces. Estos movimientos incluyen patadas, puñetazos, codazos, revolotea o rollos. [place] puede tardar entre 5 minutos a 2 horas. Anote el tiempo que sientes movimiento 10 del bebé. Si ha pasado 2 horas y tu bebé no se ha movido al menos 10 veces, usted debe llamar a la oficina de inmediato. [address] [place] [address] a [phone]:  Em Murphy que llamar inmediatamente si tengo incluso everett pequeña cantidad de LIQUIDO de mi vagina, con o sin contracciones. * Tengo que llamar si estoy SANGRADO de mi vagina. * Tengo que llamar si tengo COLICOS que continúa después de beber 2 ó 3 vasos de agua y [place] en mi lado scotty everett hora y que se siente chico que estoy teniendo un período. * Tengo que llamar si siento CONTRACCIONES más de 4 veces en everett hora quando siento que mi maryan se mantiene dura después de que trato de beber 2-3 vasos del agua y [place] en mi lado scotty everett hora. * Tengo que llamar si сергей un cambio de mi FLUJO vaginal.   * Tengo que llamar si siento la [place] que siente que el bebé aprieta en mi vagina y dura más de [place]. * Tengo que llamar si tengo DOLOR BAJO DE LA ESPALDA que es nueva y está cerca de mi cóccix. Puede entrar y salir varias veces scotty everett hora o quedarse allí constantemente. LA PRE-ECLAMPSIA    ¿Qué es? La preeclampsia es everett enfermedad grave que puede ocurrir scotty el embarazo se relaciona con la presión arterial heriberto. Le puede pasar a cualquier brandi. ¿Por qué Yes importarme?  [place] preeclampsia tienen riesgos graves pueden incluir convulsiones, trazo, daños [employer], nacimiento prematuro de madsen bebé. En los [facility] [place], puede causar la muerte de la madre y madsen bebé. ¿Qué jordy pagar Anabelle Gracie? Signos y síntomas de la preeclampsia pueden incluir:   * Inflamación severa dimple de la roxann o las   * Dolor de teodoro todavía presente después de josey tomado Tylenol   * Effie manchas o cambios en [place]   * Dolor en la parte superior del abdomen o hombro   * [place] náuseas y vómitos (en la segunda mitad del embarazo)   * Aumento de peso repentino   * Dificultad para respirar     ¿Qué jordy hacer? Si usted experimenta alguno de los síntomas de la preeclampsia, comuníquese con madsen proveedor de [facility]. Encontrar la preeclampsia temprana es importante para usted y madsen bebé. Meg Garcia al [phone].           LACTANCIA MATERNA     BENEFICIOS PARA LOS BEBÉS   ·       sistemas inmunológicos más marely (menos alergias, eczema, asma y cáncer infantil)   ·       menos diarrea y estreñimiento u otras enfermedades GI   ·       menos resfriados e infecciones del oído   ·       mejor visión y dientes (menos cavidades)   ·       mejora el IQ   ·       menores tasas de diabetes y obesidad en la infancia     BENEFICIOS PARA LAS MADRES   ·        promueve la pérdida de peso más rápida después del parto   ·        daina riesgo para la depresión postparto   ·        daina riesgo para los cánceres de [place], útero y ovario   ·        daina riesgo para la osteoporosis en desarrollo con la edad   ·        siempre es más fácil que fórmula - correcto, limpio, y la temperatura adecuada   ·        menos costosa que la fórmula es gratis!!!     CLAVES PARA SHONNA LACTANCIA EXITOSA   ·        mantener bebé piel a piel hasta después de la primera alimentación evento   ·        tener a bebé en madsen cuarto con usted scotty madsen estadía en el hospital después del parto   ·        evitar cualquier botella de alimentación (a menos que sea médicamente necesario)   ·        limitar el uso de chupones y pañales   ·        Pida ayuda si usted está teniendo problemas. .. consultores de lactancia (que se especializan en la lactancia) están disponibles para ayudarle a   ·        everett dieta saludable para mamá. .. comiendo everett variedad de alimentos y raciones con moderación     COSAS QUE DEBES SABER SOBRE LA LACTANCIA   ·        mayoría de los medicamentos se considera compatible con la lactancia materna por [address] [place] consultarlo con madsen médico o en lactancia antes de elinor un medicamento nuevo. .. para estar seguro es seguro   ·        alcohol (cerveza, vino, licor) puede transmitirse de la madre al bebé a través de la [place]. .. un ocasional, social bebida es considerado aceptable por [facility]. .. más que eso deben evitarse   ·        la lactancia materna es NO es un método para evitar embarazo   ·        nicotina (cigarrillos) puede pasar de la madre al bebé a través de la [place]. .. sin embargo, para las [employer], es aún más saludable para amamantar que use la fórmula   ·        cafeína debería limitarse a 1-3 tazas por día. .. incluye café, refrescos, bebidas energéticas           MASAJE EN LA CLEM PERINEAL O VAGINAL    Que puedo hacer ahora para reducir las probabilidades de desgarro scotty el parto? Masaje alrededor del orificio de la vagina realizado por usted misma (o por madsen ty). El masaje en esta clem, ya sea antes del parto o scotty la segunda etapa del parto, [place] reducir la probabilidad de desgarro perineal scotty el parto. Asimismo, el uso de compresas tibias en el perineo scotty la etapa expulsiva del parto puede reducir la pravedad del desparro. [place] sucedera scotty la etapa expulsiva del parto. En casa tambien puede reducir las probabilidades de sufrir lesiones durnate el parto de con masajes en la clem perineal.    Chloe Files?   [address] [facility] embarazadas a partir de, [facility], las 34 semanas de [place]. Cuando? Usted o madsen ty deben hacer masajes en la yeimy vaginal marielena 5 a 10 minutos de 1 a 4 veces por semana. [place]? Use everett mezcla de agua y aceite de [place], kumar u shields con 1 o 2 dedos (lady la comodidad). Inserte los dedos en la vagina entre 3 y 5cm. Aplique presion general hacia abajo y [employer] costados marielena 5 a [address] 3 y las 9 horas, de 1 a 4 veces por semana. SENALES MARIELENA EL EMBARAZO  Llame a nuestra oficina al [phone] para cualquiera de los siguientes:    1. Sangrado vaginal  2. Dolor abdominal dolores que no desaparece. 3. Fiebre (más de 100.4 y no se cyrus con Tylenol)  4. Vómitos persistentes que case más de 24 horas. 5. dolor de pecho  6. Dolor o ardor al orinar. 7. Dolor de teodoro severo que no se resuelve con Tylenol  8. Visión borrosa o tre puntos en madsen visión. 9. Hinchazón repentina de madsen roxann o dimple. 10. Enrojecimiento, hinchazón o dolor en everett pierna. 11. Un aumento de peso repentino en pocos días. 12. Contar los movimientos fetales del bebé. (después de 28 semanas o el sexto mes de embarazo)  15. Everett pérdida de líquido acuoso de la vagina: puede ser un chorro, un goteo o everett humedad continua  14. Después de 20 semanas de embarazo, calambres rítmicos (más de 4 por hora) o menstruales chico dolor bajo / Jennifer Jose MARIELENA EL EMBARAZO    TDAP  La tos ferina (o tos [place]) puede ser grave para cualquier persona, nay para madsen recién nacido, puede ser mortal. Hasta 20 recién nacidos mueren cada año en los [place] debido a la tos [place].  Aproximadamente la mitad de los bebés menores de 1 año de edad que contraen tos ferina necesitan tratamiento en el hospital. Cuanto más joven es el bebé cuando él o gonzalo son la tos Alireza Prince probable es que él o gonzalo tendrá que ser tratado en un hospital. Cuando usted recibe la vacuna contra la tos ferina (Tdap) scotty madsen embarazo, madsen cuerpo creará anticuerpos protectores y algunos de ellos pasan a madsen bebé antes de nacer. Estos anticuerpos pueden ayudar a proteger a madsen bebé contraigan la tos ferina hasta que tienen edad suficiente para recibir la vacuna ellos mismos (generalmente alrededor de 6 meses de edad). INFLUENZA  Cambios en las funciones inmunológica, del corazón y pulmón scotty el embarazo te hacen más susceptible a padecer seriamente enfermo de la gripe. Coger la gripe también aumenta las posibilidades de problemas graves para madsen bebé en desarrollo, incluyendo la entrega y el trabajo de Adriane. Se recomienda que todas las mujeres que están embarazadas scotty la temporada de gripe deben recibir everett vacuna contra la influenza.

## 2023-08-22 NOTE — H&P
History & Physical - OB/GYN   [facility] 21 [facility] MRN: [id number]  Unit/Bed#:  TRIAGE  Encounter: [id number]    Riki Norman is a patient of [address] [place] [address] [address]    Chief complaint:  "vaginal spotting and cramping"    HPI:  Riki Norman is a 21 y.o. [id number] female with an AVILA of 10/20/2023, by Last Menstrual Period at 31w4d weeks gestation who is being admitted for  labor. She reports vaginal bleeding intermittently for the past ten days, as well as cramping and back pain that she has noted with previous pregnancies. At her prenatal appointment, she was noted to be 3cm dilated and advised to present to L&D triage. On reassessment in triage, she states she is feeling contractions every 15 minutes. She denies LOF and endorses good fetal movement. She denies fevers, chills, nausea, vomiting, diarrhea, or burning on urination. She has had no prenatal care prior to her presentation to [facility] on 23 for her initial visit.      Pregnancy Complications:  Late to prenatal care  Anemia    PMH:  Past Medical History:   Diagnosis Date   • Asthma     early childhood       PSH:  Past Surgical History:   Procedure Laterality Date   • NO PAST SURGERIES         Social Hx:  Social History     Tobacco Use   • Smoking status: Never   • Smokeless tobacco: Never   Vaping Use   • Vaping Use: Never used   Substance Use Topics   • Alcohol use: Not Currently     Comment: couple times/month, none since pregnant   • Drug use: Never       OB Hx:  OB History    Para Term  AB Living   4 2 2 0 1 2   SAB IAB Ectopic Multiple Live Births   1 0 0 0 2      # Outcome Date GA Lbr Mario Alberto/2nd Weight Sex Delivery Anes PTL Lv   4 Current            3 Term 20 37w0d  2268 g (5 lb) F Vag-Spont   QUYEN      Birth Comments: FOB3      Complications: IUGR (intrauterine growth restriction) affecting care of mother   2 SAB 2017     SAB         Birth Comments: FOB2   1 Term 10/01/15 38w0d  3175 g (7 lb) F Vag-Spont None N QUYEN      Birth Comments: FOB1       Meds:  No current facility-administered medications on file prior to encounter. Current Outpatient Medications on File Prior to Encounter   Medication Sig Dispense Refill   • ferrous sulfate 324 (65 Fe) mg Take 1 tablet (324 mg total) by mouth daily before breakfast 30 tablet 5   • Prenatal Vit-Fe Fumarate-FA (Prenatal Plus Vitamin/Mineral) 27-1 MG TABS Take 1 tablet by mouth in the morning 90 tablet 4       Allergies:  No Known Allergies    ROS:  Constitutional: Negative for fevers, chills, headaches, vision changes  Respiratory: Negative for shortness of breath, cough  Cardiovascular: Negative for chest pain, palpitations, lower extremity edema    Gastrointestinal: Negative for nausea, vomiting, diarrhea, constipation  :  Negative for dysuria, hematuria  EXTR:  Negative for rash, new myalgias/arthralgias, joint swelling    Physical Exam  Vitals reviewed. Constitutional:       Appearance: Normal appearance. Cardiovascular:      Rate and Rhythm: Normal rate and regular rhythm. Heart sounds: Normal heart sounds. Pulmonary:      Effort: Pulmonary effort is normal. No respiratory distress. Abdominal:      Palpations: Abdomen is soft. Tenderness: There is no abdominal tenderness. There is no guarding or rebound. Comments: gravid   Musculoskeletal:      Right lower leg: No edema. Left lower leg: No edema. Skin:     General: Skin is warm and dry. Capillary Refill: Capillary refill takes less than 2 seconds. Neurological:      Mental Status: She is alert.        Bedside biometry:          Cervix:    4.5/60/-2    Fetal heart rate:   Baseline Rate: 130 bpm  Variability: Moderate 6-25 bpm  Accelerations: 10 x 10 (<32 weeks)  Decelerations: None  FHR Category: Category I    [place]:   Contraction Frequency (minutes): 2-4  Contraction Duration (seconds): 40-50  Contraction Quality: Mild    GBS: pending    Membranes: intact    Labs:  Hemoglobin: 10.7 (23)  Blood type: A+  Antibody: negative  Group B strep: pending  HIV: negative  Hepatitis B: negative   Hepatitis C: negative  RPR: non-reactive   Rubella: Immune  Varicella Unknown  1 hour Glucose: normal     Assessment:   21 y.o. [id number] at 31w4d who presents with vaginal bleeding and contractions, admitted for  labor. Plan:   1. IUP at 31w4d by LMP, no 1st trimester ultrasound, measuring 28w2d by unofficial 3rd trim US   - Continuous FHR and tocometry monitoring   - fetus vertex   - MFM to perform growth scan tomorrow  . Membrane status: intact  .  labor assessment   - SSE: cervix not visibly dilated, no bleeding or lesions, white discharge from cervical os   - FFN: pending   - SVE: 4.5/60/-2   - CL 1.58-2.09cm on TVUS   - Infection workup    - GBS: pending    - Gonorrhea/chlamydia: pending    - Urine drug screen: pending    - Urine analysis/culture: pending  .  labor management   - BTM for FLM 12mg q24h for 2 doses   - Magnesium for neuroprotection (<32w) 6g loading, 2g maintenance dose   . Future contraception in the event of an emergency   - Pt desires permanent sterilization if CS, however she does not have insurance   . Routine antepartum labs   - CBC, RPR, T&S stat  . MFM consult placed  . NICU consult placed  .  FEN   - Clear liquid diet, IV LR for hydration      D/w Dr. Glory Hwang MD  OB/GYN PGY-2  2023  6:59 PM

## 2023-08-22 NOTE — PROGRESS NOTES
uJstyn Garrido presents today for H&P OB visit at 31w4d. Blood Pressure: 115/57  Wt=96.1 kg (211 lb 12.8 oz); Body mass index is 33.17 kg/m².; TWG=6.26 kg (13 lb 12.8 oz)  Fetal Heart Rate: 134; Fundal Height (cm): 32 cm  Abdomen: gravid, soft, non-tender. She reports episode of pinkish vaginal spotting and back cramping approximately 10 days ago. States just now during visit she went to the bathroom and when she wiped she noticed a small amount of bright red vaginal spotting. Denies any uterine contractions or abdominal pain but reports a small amount of cramping in her back which just started. Reports adequate fetal movement of at least 10 movements in 2 hours once daily. Bedside ultrasound performed now confirms fundal placenta, SIUP in vertex position. Gentle pelvic exam notes cervix dilated 3cm and small amount red blood in vault. Reviewed premature labor precautions and fetal kick counts. Advised to proceed to L&D at [facility] for further evaluation.  will coordinate transportation for patient as she walked to today's appointment. Current Outpatient Medications   Medication Instructions   • ferrous sulfate 324 mg, Oral, Daily before breakfast   • Prenatal Vit-Fe Fumarate-FA (Prenatal Plus Vitamin/Mineral) 27-1 MG TABS 1 tablet, Oral, Daily       Laboratory workup: initial OB & 28 week labs (done 8/8/23)    Genetic Screening: too late at presentation for screening    Vaccinations: influenza (will offer during flu season);  Tdap (given 8/7/23); COVID-19 (rec'd x2 doses per patient report)    Postpartum contraception: desires Nexplanon    Fetal Ultrasounds:  scheduled w/MFM for 8/31/23      G4 Problems (from 08/07/23 to present)     Problem Noted Resolved    Not immune to hepatitis B virus 8/10/2023 by AVE Jj No    Overview Signed 8/10/2023  1:10 PM by Larry Joiner [address] [place] [address]     Refer to PCP         Maternal anemia 8/8/2023 by AVE Jj No Overview Addendum 2023  1:23 PM by AVE Asher     Needs Fe supplement - Rx ordered 23               Past Medical History:   Diagnosis Date   • Asthma     early childhood     Past Surgical History:   Procedure Laterality Date   • NO PAST SURGERIES       OB History        4    Para   2    Term   2       0    AB   1    Living   2       SAB   1    IAB   0    Ectopic   0    Multiple   0    Live Births   2               Social History     Tobacco Use   • Smoking status: Never   • Smokeless tobacco: Never   Vaping Use   • Vaping Use: Never used   Substance Use Topics   • Alcohol use: Not Currently     Comment: couple times/month, none since pregnant   • Drug use: Never

## 2023-08-23 LAB
C TRACH DNA SPEC QL NAA+PROBE: NEGATIVE
N GONORRHOEA DNA SPEC QL NAA+PROBE: NEGATIVE
TREPONEMA PALLIDUM IGG+IGM AB [PRESENCE] IN SERUM OR PLASMA BY IMMUNOASSAY: NORMAL

## 2023-08-23 PROCEDURE — 99253 IP/OBS CNSLTJ NEW/EST LOW 45: CPT | Performed by: STUDENT IN AN ORGANIZED HEALTH CARE EDUCATION/TRAINING PROGRAM

## 2023-08-23 PROCEDURE — 4A1HXCZ MONITORING OF PRODUCTS OF CONCEPTION, CARDIAC RATE, EXTERNAL APPROACH: ICD-10-PCS | Performed by: OBSTETRICS & GYNECOLOGY

## 2023-08-23 PROCEDURE — 99231 SBSQ HOSP IP/OBS SF/LOW 25: CPT | Performed by: OBSTETRICS & GYNECOLOGY

## 2023-08-23 RX ADMIN — BETAMETHASONE SODIUM PHOSPHATE AND BETAMETHASONE ACETATE 12 MG: 3; 3 INJECTION, SUSPENSION INTRA-ARTICULAR; INTRALESIONAL; INTRAMUSCULAR at 19:55

## 2023-08-23 RX ADMIN — SODIUM CHLORIDE, SODIUM LACTATE, POTASSIUM CHLORIDE, AND CALCIUM CHLORIDE 125 ML/HR: .6; .31; .03; .02 INJECTION, SOLUTION INTRAVENOUS at 16:53

## 2023-08-23 RX ADMIN — SODIUM CHLORIDE, SODIUM LACTATE, POTASSIUM CHLORIDE, AND CALCIUM CHLORIDE 50 ML/HR: .6; .31; .03; .02 INJECTION, SOLUTION INTRAVENOUS at 06:16

## 2023-08-23 RX ADMIN — MAGNESIUM SULFATE IN WATER 2 G/HR: 20 INJECTION, SOLUTION INTRAVENOUS at 06:14

## 2023-08-23 RX ADMIN — SODIUM CHLORIDE 2.5 MILLION UNITS: 9 INJECTION, SOLUTION INTRAVENOUS at 07:45

## 2023-08-23 RX ADMIN — SODIUM CHLORIDE, SODIUM LACTATE, POTASSIUM CHLORIDE, AND CALCIUM CHLORIDE 125 ML/HR: .6; .31; .03; .02 INJECTION, SOLUTION INTRAVENOUS at 19:55

## 2023-08-23 RX ADMIN — SODIUM CHLORIDE 2.5 MILLION UNITS: 9 INJECTION, SOLUTION INTRAVENOUS at 03:37

## 2023-08-23 NOTE — PROGRESS NOTES
Called by nurse that patient is feeling more contractions q10-15 minutes, CVX is unchanged at 5/60/-2, EFM is without decelerations.

## 2023-08-23 NOTE — CONSULTS
NICU Prenatal Consult   Digmary RiveraReyes 21 y.o. female MRN: [id number]  Unit/Bed#: -01 Encounter: [id number]    Consulting Physician: Aliyah Espinosa MD      A NICU Prenatal Consult has been requested by OB due to  labor at 31 weeks estimated gestation. Scottie Keyes is a 21 y.o. [id number] at 31+4 weeks estimated gestation based on previous prenatal US during early 2rd TM. This is Pamela's second medical visit in the [address] [place] [address] during this pregnancy. She recently moved from the [facility]. Pamela does not know the sex of her baby. Estimated fetal weight is unknown. She intends to breastfeed and formula feed. I met with Pamela     Prenatal Care:No    Prenatal Labs:  Lab Results   Component Value Date/Time    ABO Grouping O 2023 09:49 AM    Rh Factor Positive 2023 09:49 AM    Hepatitis B Surface Ag Non-reactive 2023 09:49 AM    Hepatitis C Ab Non-reactive 2023 09:49 AM    Rubella IgG Quant 106.1 2023 09:49 AM       Unknown labs at this time: GBS, HIV, Syphilis screen    Pregnancy complications:   Patient Active Problem List   Diagnosis   • Maternal anemia   • Not immune to hepatitis B virus   • 31 weeks gestation of pregnancy   •  labor     Maternal medical history:   Past Medical History:   Diagnosis Date   • Asthma     early childhood     Medications at home:   Medications Prior to Admission   Medication   • ferrous sulfate 324 (65 Fe) mg   • Prenatal Vit-Fe Fumarate-FA (Prenatal Plus Vitamin/Mineral) 27-1 MG TABS     Maternal social history:  Social History     Tobacco Use   • Smoking status: Never   • Smokeless tobacco: Never   Substance Use Topics   • Alcohol use: Not Currently     Comment: couple times/month, none since pregnant     Maternal  medications: None      I spoke with Scottie Keyes today regarding the upcoming birth of her child.   We discussed the baby's possible medical problems and the specialized care needed to address these problems. This discussion included, but was not limited to: Attendance of physician/PARRIS/ nursing, and/or respiratory therapist at the delivery and delivery room management , Possible need for IV access, umbilical lines, and/or PICC lines, Risk of feedings problems and the potential need for IV fluids and gavage tube feeds, Risk of hypoglycemia requiring dextrose infusion, Risk of hypothermia and need for radiant warmer and/or isolette, Risk of immature cardiorespiratory control and need for monitoring and/or caffeine, Risk of intraventricular hemorrhage and possible need for brain ultrasound, Risk of jaundice requiring phototherapy, Risk of necrotizing enterocolitis and advantages of expressed breast milk and Risk of respiratory distress and possible need for support (oxygen, CPAP, intubation, and/or surfactant)    We discussed NICU visitation, hours of operation, NicView cameras, and discharge criteria. All of Pamela's questions were answered to the best of my ability, and she was encouraged to contact us with any further questions. Thank you for including us in the care of Digmary RiveraReyes. Please reach out to the on-call Neonatologist via [employer] for any further questions that may arise. I spent 40 minutes in consultation with Digmary RiveraReyes, of which 80% was in direct communication.     Rizwana Hall, DO  - Medicine     [employer] translation in French facilitated by Riddhi Parker #[id number]

## 2023-08-23 NOTE — PLAN OF CARE
Problem: BIRTH - VAGINAL/ SECTION  Goal: Fetal and maternal status remain reassuring during the birth process  Description: INTERVENTIONS:  - Monitor vital signs  - Monitor fetal heart rate  - Monitor uterine activity  - Monitor labor progression (vaginal delivery)  - DVT prophylaxis  - Antibiotic prophylaxis  Outcome: Progressing  Goal: Emotionally satisfying birthing experience for mother/fetus  Description: Interventions:  - Assess, plan, implement and evaluate the nursing care given to the patient in labor  - Advocate the philosophy that each childbirth experience is a unique experience and support the family's chosen level of involvement and control during the labor process   - Actively participate in both the patient's and family's teaching of the birth process  - Consider cultural, Sabianism and age-specific factors and plan care for the patient in labor  Outcome: Progressing     Problem: PAIN - ADULT  Goal: Verbalizes/displays adequate comfort level or baseline comfort level  Description: Interventions:  - Encourage patient to monitor pain and request assistance  - Assess pain using appropriate pain scale  - Administer analgesics based on type and severity of pain and evaluate response  - Implement non-pharmacological measures as appropriate and evaluate response  - Consider cultural and social influences on pain and pain management  - Notify physician/advanced practitioner if interventions unsuccessful or patient reports new pain  Outcome: Progressing     Problem: INFECTION - ADULT  Goal: Absence or prevention of progression during hospitalization  Description: INTERVENTIONS:  - Assess and monitor for signs and symptoms of infection  - Monitor lab/diagnostic results  - Monitor all insertion sites, i.e. indwelling lines, tubes, and drains  - Monitor endotracheal if appropriate and nasal secretions for changes in amount and color  - [place] appropriate cooling/warming therapies per order  - Administer medications as ordered  - Instruct and encourage patient and family to use good hand hygiene technique  - Identify and instruct in appropriate isolation precautions for identified infection/condition  Outcome: Progressing  Goal: Absence of fever/infection during neutropenic period  Description: INTERVENTIONS:  - Monitor WBC    Outcome: Progressing     Problem: SAFETY ADULT  Goal: Patient will remain free of falls  Description: INTERVENTIONS:  - Educate patient/family on patient safety including physical limitations  - Instruct patient to call for assistance with activity   - Consult OT/PT to assist with strengthening/mobility   - Keep Call bell within reach  - Keep bed low and locked with side rails adjusted as appropriate  - Keep care items and personal belongings within reach  - Initiate and maintain comfort rounds  - Make Fall Risk Sign visible to staff  - Apply yellow socks and bracelet for high fall risk patients  - Consider moving patient to room near nurses station  Outcome: Progressing  Goal: Maintain or return to baseline ADL function  Description: INTERVENTIONS:  -  Assess patient's ability to carry out ADLs; assess patient's baseline for ADL function and identify physical deficits which impact ability to perform ADLs (bathing, care of mouth/teeth, toileting, grooming, dressing, etc.)  - Assess/evaluate cause of self-care deficits   - Assess range of motion  - Assess patient's mobility; develop plan if impaired  - Assess patient's need for assistive devices and provide as appropriate  - Encourage maximum independence but intervene and supervise when necessary  - Involve family in performance of ADLs  - Assess for home care needs following discharge   - Consider OT consult to assist with ADL evaluation and planning for discharge  - Provide patient education as appropriate  Outcome: Progressing  Goal: Maintains/Returns to pre admission functional level  Description: INTERVENTIONS:  - Perform BMAT or MOVE assessment daily.   - Set and communicate daily mobility goal to care team and patient/family/caregiver. - Collaborate with rehabilitation services on mobility goals if consulted  - Out of bed for toileting  - Record patient progress and toleration of activity level   Outcome: Progressing     Problem: Knowledge Deficit  Goal: Patient/family/caregiver demonstrates understanding of disease process, treatment plan, medications, and discharge instructions  Description: Complete learning assessment and assess knowledge base. Interventions:  - Provide teaching at level of understanding  - Provide teaching via preferred learning methods  Outcome: Progressing     Problem: DISCHARGE PLANNING  Goal: Discharge to home or other facility with appropriate resources  Description: INTERVENTIONS:  - Identify barriers to discharge w/patient and caregiver  - Arrange for needed discharge resources and transportation as appropriate  - Identify discharge learning needs (meds, wound care, etc.)  - Arrange for interpretive services to assist at discharge as needed  - Refer to Case Management Department for coordinating discharge planning if the patient needs post-hospital services based on physician/advanced practitioner order or complex needs related to functional status, cognitive ability, or social support system  Outcome: Progressing     Problem: Nutrition/Hydration-ADULT  Goal: Nutrient/Hydration intake appropriate for improving, restoring or maintaining nutritional needs  Description: Monitor and assess patient's nutrition/hydration status for malnutrition. Collaborate with interdisciplinary team and initiate plan and interventions as ordered. Monitor patient's weight and dietary intake as ordered or per policy. Utilize nutrition screening tool and intervene as necessary. Determine patient's food preferences and provide high-protein, high-caloric foods as appropriate.      INTERVENTIONS:  - Monitor oral intake, urinary output, labs, and treatment plans  - Assess nutrition and hydration status and recommend course of action  - Evaluate amount of meals eaten  - Assist patient with eating if necessary   - Allow adequate time for meals  - Recommend/ encourage appropriate diets, oral nutritional supplements, and vitamin/mineral supplements  - Order, calculate, and assess calorie counts as needed  - Recommend, monitor, and adjust tube feedings and TPN/PPN based on assessed needs  - Assess need for intravenous fluids  - Provide specific nutrition/hydration education as appropriate  - Include patient/family/caregiver in decisions related to nutrition  Outcome: Progressing

## 2023-08-23 NOTE — OB LABOR/OXYTOCIN SAFETY PROGRESS
Labor Progress Note - Digmary RiveraReyes 21 y.o. female MRN: [id number]    Unit/Bed#: -01 Encounter: [id number]       Contraction Frequency (minutes): 2-4  Contraction Quality: Mild  Tachysystole: No   Cervical Dilation: 5  Cervical Effacement: 60  Fetal Station: -2  Baseline Rate: 130 bpm  Fetal Heart Rate: 140 BPM  FHR Category: Category I      Vital Signs:   Vitals:    08/22/23 2019   BP:    Pulse: 102   Resp:    Temp:    SpO2: 97%       Notes/comments: small cervical change noted. Continue current interventions: magnesium, PCN, IV fluid hydration. Reassess if patient becomes mores uncomfortable.         Campbell Purcell MD 8/22/2023 8:50 PM

## 2023-08-23 NOTE — CONSULTS
M CONSULTATION  Digmary RiveraReyes 21 y.o. female MRN: [id number]  Unit/Bed#: -01 Encounter: [id number]  Admit date: 2023. Today's date: 23      Assessment/Plan   Digmary RiveraReyes is a 21y.o. year-old [id number] at [facility] Day: 2, admitted with cervical dilation and concern for  labor. We discussed her presentation consisting of intermittent pain, spotting, and cervical dilation are concerning for  labor. However, it is reassuring she is not taylor on toco has not made further cervical change. While she remains at risk for  labor and delivery, there is no evidence of imminent delivery. Given advanced cervical dilation, she will remain in the hospital for a period of observation. She is due for her second dose of betamethasone tonight. Magnesium and penicillin were discontinued earlier in the day as there was no cervical change. The fetal status is reassuring. She will be monitored on antepartum with 3 times daily NSTs. If delivery is imminent, recommend rescanning to confirm fetal position. Anticipated mode of delivery is vaginal and the fetus was last cephalic. Chief Complaint   Patient presents with   • Vaginal Bleeding - Pregnant     Spotting       Chief Complaint: contractions and vaginal bleeding    Admitting Physician: Liliam Aleman MD  Reason for Admission/Principal Problem:  labor     History of Present Illness   Erik Espinoza is a 21 y.o. [id number] with an AVILA of Estimated Date of Delivery: 10/20/23 at [facility] Day: 2, admitted for  labor . Her current pregnancy is significant for no prenatal care. Her obstetrical history is significant for none. She complains of uterine contractions, occurring irregularly, has no LOF, and reports spotting. She states she has felt good FM. She presented to the office yesterday for a routine prenatal visit and was noted to be 3cm dilated.  She is now stable at 60/-2. Fetal complications/anomalies this pregnancy: none but lack of prenatal care    Other obstetric review of symptoms:  Contractions: irregular, some back pain. Leakage of fluid: None. Bleeding: scant dark brown to pink spotting, no heavy bleeding  Fetal movement: present. Pertinent items are noted in HPI. Pregnancy Plan:     Delivery Plans  Deliver by GA (weeks): 42  Planned delivery method: Vaginal  Planned delivery location: [facility]  Planned anesthesia: None  Acceptable blood products: All     Post-Delivery Plans  Feeding intentions: Breast Milk and Non-human milk substitute  Planned birth control: Implant    Other history is as follows:    Historical Information   OB History    Para Term  AB Living   4 2 2 0 1 2   SAB IAB Ectopic Multiple Live Births   1 0 0 0 2      # Outcome Date GA Lbr Mario Alberto/2nd Weight Sex Delivery Anes PTL Lv   4 Current            3 Term 20 37w0d  2268 g (5 lb) F Vag-Spont   QUYEN      Birth Comments: FOB3      Complications: IUGR (intrauterine growth restriction) affecting care of mother   2 SAB 2017     SAB         Birth Comments: FOB2   1 Term 10/01/15 38w0d  3175 g (7 lb) F Vag-Spont None N QUYEN      Birth Comments: FOB1     Gynecologic history: Patient's last menstrual period was 2023 (exact date). Past Medical History:   Diagnosis Date   • Asthma     early childhood     Past Surgical History:   Procedure Laterality Date   • NO PAST SURGERIES       Social History   Social History     Substance and Sexual Activity   Alcohol Use Not Currently    Comment: couple times/month, none since pregnant     Social History     Substance and Sexual Activity   Drug Use Never     Social History     Tobacco Use   Smoking Status Never   Smokeless Tobacco Never     Family History: not obtained    No Known Allergies    Meds/Allergies   Prior to Admission Medications   Prescriptions Last Dose Informant Patient Reported? Taking?    Prenatal Vit-Fe Fumarate-FA (Prenatal Plus Vitamin/Mineral) 27-1 MG TABS 8/21/2023  No Yes   Sig: Take 1 tablet by mouth in the morning   ferrous sulfate 324 (65 Fe) mg Past Month  No Yes   Sig: Take 1 tablet (324 mg total) by mouth daily before breakfast      Facility-Administered Medications: None     Medication Administration - last 24 hours from 08/22/2023 1645 to 08/23/2023 1645       Date/Time Order Dose Route Action Action by     08/22/2023 2140 EDT lactated ringers bolus 1,000 mL 0 mL Intravenous Stopped Jacinto Hendrix RN     08/22/2023 2008 EDT lactated ringers bolus 1,000 mL 1,000 mL Intravenous New Bag Jacinto Hendrix RN     08/23/2023 5753 EDT lactated ringers infusion 0 mL/hr Intravenous Stopped Kenan Murphy RN     08/23/2023 0799 EDT lactated ringers infusion 50 mL/hr Intravenous New [address], RN     08/23/2023 8439 EDT lactated ringers infusion 0 mL/hr Intravenous Stopped Jacinto Hendrix RN     08/22/2023 2142 EDT lactated ringers infusion 50 mL/hr Intravenous New Ary Hendrix RN     08/22/2023 2155 EDT penicillin G (PFIZERPEN-G) in 0.9% sodium chloride 250 mL IVPB 5 Million Units 0 Million Units Intravenous Stopped Jacinto Hendrix RN     08/22/2023 1954 EDT penicillin G (PFIZERPEN-G) in 0.9% sodium chloride 250 mL IVPB 5 Million Units 5 Million Units Intravenous [address]  Jacinto Hendrix RN     08/23/2023 7461 EDT penicillin G (PFIZERPEN-G) in 0.9% sodium chloride 100 mL IVPB 2.5 Million Units 0 Million Units Intravenous Stopped Kenan Murphy RN     08/23/2023 0745 EDT penicillin G (PFIZERPEN-G) in 0.9% sodium chloride 100 mL IVPB 2.5 Million Units 2.5 Million Units Intravenous [address] [place], [place]     08/23/2023 7338 EDT penicillin G (PFIZERPEN-G) in 0.9% sodium chloride 100 mL IVPB 2.5 Million Units 2.5 Million Units Intravenous New Bag Jacinto Hendrix RN     08/22/2023 4976 EDT penicillin G (PFIZERPEN-G) in 0.9% sodium chloride 100 mL IVPB 2.5 Million Units 2.5 Million Units Intravenous Pal Hendrix RN 08/22/2023 1952 EDT betamethasone acetate-betamethasone sodium phosphate (CELESTONE) injection 12 mg 12 mg Intramuscular Given Rebekah Pedraza RN     08/22/2023 2005 EDT magnesium sulfate 4 g/100 mL IVPB (premix) 4 g 0 g Intravenous Stopped Rebekah Pedraza RN     08/22/2023 1948 EDT magnesium sulfate 4 g/100 mL IVPB (premix) 4 g 4 g Intravenous New Bag Rebekah Pedraza RN     08/22/2023 2018 EDT magnesium sulfate 2 g/50 mL IVPB (premix) 2 g 0 g Intravenous Stopped Rebekah Pedraza RN     08/22/2023 2006 EDT magnesium sulfate 2 g/50 mL IVPB (premix) 2 g 2 g Intravenous New Bag Rebekah Pedraza RN     08/23/2023 0255 EDT magnesium sulfate 20 g/500 mL infusion (premix) 0 g/hr Intravenous Stopped Haroon Dickinson RN     08/23/2023 5495 EDT magnesium sulfate 20 g/500 mL infusion (premix) 2 g/hr Intravenous New Bag Rebekah Pedraza RN     08/22/2023 2019 EDT magnesium sulfate 20 g/500 mL infusion (premix) 2 g/hr Intravenous New Bag Rebekah Pedraza RN        Current Facility-Administered Medications   Medication Dose Route Frequency   • acetaminophen (TYLENOL) tablet 650 mg  650 mg Oral Q4H PRN   • betamethasone acetate-betamethasone sodium phosphate (CELESTONE) injection 12 mg  12 mg Intramuscular Q24H   • calcium gluconate 10 % injection 1 g  1 g Intravenous Once PRN   • lactated ringers infusion  125 mL/hr Intravenous Continuous   • ondansetron (ZOFRAN) injection 4 mg  4 mg Intravenous Q8H PRN       Objective    Patient Vitals for the past 24 hrs:   BP Temp Temp src Pulse Resp SpO2 Height Weight   08/23/23 1627 116/69 98.1 °F (36.7 °C) Oral 82 18 100 % -- --   08/23/23 0800 131/72 -- -- 86 -- -- -- --   08/23/23 0730 117/72 -- -- 86 -- -- -- --   08/23/23 0700 -- 98.1 °F (36.7 °C) Oral -- 16 -- 5' 7" (1.702 m) 95.7 kg (211 lb)   08/23/23 0645 111/74 -- -- 83 -- -- -- --   08/23/23 0630 117/76 -- -- 84 -- -- -- --   08/23/23 0629 117/76 -- -- 84 -- -- -- --   08/23/23 0623 114/74 -- -- -- -- -- -- --   08/23/23 0615 114/74 -- -- 80 -- -- -- --   08/23/23 0600 120/78 -- -- 91 -- -- -- --   08/23/23 0545 122/67 -- -- 80 -- -- -- --   08/23/23 0530 124/69 -- -- 79 -- -- -- --   08/23/23 0515 123/70 -- -- 80 -- -- -- --   08/23/23 0500 124/63 -- -- 76 -- -- -- --   08/23/23 0446 115/70 -- -- 89 -- -- -- --   08/23/23 0430 122/74 -- -- 82 -- -- -- --   08/23/23 0400 110/68 -- -- 76 -- -- -- --   08/23/23 0345 108/67 -- -- 75 -- -- -- --   08/23/23 0330 119/68 -- -- 81 -- -- -- --   08/23/23 0315 118/68 -- -- 82 -- -- -- --   08/23/23 0300 115/68 -- -- 82 -- -- -- --   08/23/23 0245 111/66 -- -- 90 -- -- -- --   08/23/23 0230 110/67 -- -- 82 -- -- -- --   08/23/23 0200 123/67 -- -- 82 -- -- -- --   08/23/23 0145 129/70 -- -- 95 -- -- -- --   08/23/23 0130 119/62 -- -- 80 -- -- -- --   08/23/23 0115 115/59 -- -- 79 -- -- -- --   08/23/23 0100 114/71 -- -- 89 -- -- -- --   08/23/23 0045 121/75 -- -- 89 -- -- -- --   08/23/23 0030 118/68 -- -- -- -- -- -- --   08/23/23 0015 -- -- -- 85 -- -- -- --   08/23/23 0000 115/67 -- -- 90 -- -- -- --   08/22/23 2345 116/69 -- -- 85 -- -- -- --   08/22/23 2315 125/74 -- -- 86 -- -- -- --   08/22/23 2300 125/72 98.3 °F (36.8 °C) Oral 93 18 -- -- --   08/22/23 2245 115/63 -- -- 85 -- -- -- --   08/22/23 2230 120/75 -- -- 88 -- -- -- --   08/22/23 2215 124/75 -- -- 90 -- -- -- --   08/22/23 2200 126/70 -- -- 92 -- -- -- --   08/22/23 2145 116/67 -- -- 90 -- -- -- --   08/22/23 2130 116/69 -- -- -- -- -- -- --   08/22/23 2108 118/61 -- -- -- -- -- -- --   08/22/23 2100 118/61 -- -- 85 -- -- -- --   08/22/23 2019 -- -- -- 102 -- 97 % -- --   08/22/23 2015 118/76 -- -- 98 -- -- -- --   08/2000 111/77 -- -- 93 -- -- -- --   08/22/23 1948 103/68 -- -- 88 -- -- -- --     Vitals: Blood pressure 116/69, pulse 82, temperature 98.1 °F (36.7 °C), temperature source Oral, resp. rate 18, height 5' 7" (1.702 m), weight 95.7 kg (211 lb), last menstrual period 01/13/2023, SpO2 100 %. Body mass index is 33.05 kg/m².     Physical Exam  Constitutional:       General: She is not in acute distress. Appearance: Normal appearance. HENT:      Head: Normocephalic and atraumatic. Eyes:      Extraocular Movements: Extraocular movements intact. Cardiovascular:      Rate and Rhythm: Normal rate. Pulmonary:      Effort: Pulmonary effort is normal. No respiratory distress. Abdominal:      Palpations: Abdomen is soft. Tenderness: There is no abdominal tenderness. Skin:     Findings: No erythema or rash. Neurological:      Mental Status: She is alert and oriented to person, place, and time.    Psychiatric:         Mood and Affect: Mood normal.         Behavior: Behavior normal.          SVE:   Cervical Dilation: 5  Cervical Effacement: 60  Cervical Consistency: Soft  Fetal Station: -2  Presentation: Vertex  Position: Unknown  Method: Manual  OB Examiner: [place]z [place]    FHT:   Baseline Rate: 125 bpm  Variability: Moderate 6-25 bpm  Accelerations: 10 x 10 (<32 weeks)  Decelerations: None  FHR Category: Category I    [place]:  Contraction Frequency (minutes): 1 cxt noted  Contraction Duration (seconds): 80  Contraction Quality: Mild    Prenatal Labs: pending    Lab Results   Component Value Date    WBC 10.74 (H) 08/22/2023    HGB 11.3 (L) 08/22/2023    HCT 35.2 08/22/2023     08/22/2023     No results found for: "NA", "K", "CL", "CO2", "BUN", "CREATININE", "GLUCOSE", "AST", "ALT"    Fetal data: see report in OB     Invasive Devices     Peripheral Intravenous Line  Duration           Peripheral IV 08/22/23 Distal;Left;Ventral (anterior) Forearm <1 day                >2 Midnights  [address] Tomás Hooks MD  Maternal-Fetal Medicine  8/23/2023  4:45 PM

## 2023-08-24 VITALS
TEMPERATURE: 98 F | OXYGEN SATURATION: 97 % | WEIGHT: 211 LBS | HEART RATE: 78 BPM | DIASTOLIC BLOOD PRESSURE: 66 MMHG | SYSTOLIC BLOOD PRESSURE: 120 MMHG | BODY MASS INDEX: 33.12 KG/M2 | RESPIRATION RATE: 20 BRPM | HEIGHT: 67 IN

## 2023-08-24 LAB
BACTERIA UR QL AUTO: ABNORMAL /HPF
BILIRUB UR QL STRIP: NEGATIVE
CLARITY UR: ABNORMAL
COLOR UR: ABNORMAL
GLUCOSE UR STRIP-MCNC: ABNORMAL MG/DL
GP B STREP DNA SPEC QL NAA+PROBE: NEGATIVE
HGB UR QL STRIP.AUTO: NEGATIVE
KETONES UR STRIP-MCNC: NEGATIVE MG/DL
LEUKOCYTE ESTERASE UR QL STRIP: ABNORMAL
NITRITE UR QL STRIP: NEGATIVE
NON-SQ EPI CELLS URNS QL MICRO: ABNORMAL /HPF
PH UR STRIP.AUTO: 7.5 [PH]
PROT UR STRIP-MCNC: NEGATIVE MG/DL
RBC #/AREA URNS AUTO: ABNORMAL /HPF
SP GR UR STRIP.AUTO: 1.01 (ref 1–1.03)
UROBILINOGEN UR STRIP-ACNC: <2 MG/DL
WBC #/AREA URNS AUTO: ABNORMAL /HPF

## 2023-08-24 PROCEDURE — 76805 OB US >/= 14 WKS SNGL FETUS: CPT | Performed by: STUDENT IN AN ORGANIZED HEALTH CARE EDUCATION/TRAINING PROGRAM

## 2023-08-24 PROCEDURE — 81001 URINALYSIS AUTO W/SCOPE: CPT | Performed by: OBSTETRICS & GYNECOLOGY

## 2023-08-24 PROCEDURE — 99238 HOSP IP/OBS DSCHRG MGMT 30/<: CPT | Performed by: OBSTETRICS & GYNECOLOGY

## 2023-08-24 RX ADMIN — SODIUM CHLORIDE, SODIUM LACTATE, POTASSIUM CHLORIDE, AND CALCIUM CHLORIDE 125 ML/HR: .6; .31; .03; .02 INJECTION, SOLUTION INTRAVENOUS at 03:54

## 2023-08-24 NOTE — PROGRESS NOTES
OBGYN Progress Note - Antepartum  Digjaye RiverJoseyes 21 y.o. female MRN: [id number]  Unit/Bed#: -01 Encounter: [id number]    Assessment/Plan:  21 y.o. [id number] at [address] [place] [address] admitted for  labor. Hospital day 3. By problem:    31 weeks gestation of pregnancy  Assessment & Plan  Prenatal panel wnl  1hr GTT wnl    Maternal anemia  Assessment & Plan  Hgb 10.7 on 23    *  labor  Assessment & Plan  Assessment:   21 y.o. [id number] at [address] [place] [address] who presents with vaginal bleeding and contractions, admitted for  labor    Plan:   1. IUP at 31w4d by LMP, no 1st trimester ultrasound, measuring 28w2d by unofficial 3rd trim US   - Continuous FHR and tocometry monitoring   - fetus vertex   - MFM to perform growth scan tomorrow  . Membrane status: intact  .  labor assessment   - SSE: cervix not visibly dilated, no bleeding or lesions, white discharge from cervical os   - FFN: pending   - SVE: 4.5/60/-2   - CL 1.58-2.09cm on TVUS   - Infection workup    - GBS: pending    - Gonorrhea/chlamydia: negative    - Urine analysis/culture: pending  .  labor management   - s/p 2 doses BTM for FLM   - s/p magnesium    - s/p Penicillin  . Future contraception in the event of an emergency   - Pt desires permanent sterilization if CS, however she does not have insurance   . Routine antepartum labs   - CBC, RPR, T&S stat  . MFM consult   - dating by LMP   - NST TID  . NICU consult placed  . FEN   - Regular, IV LR for hydration        Subjective: To my room Rosmery is a 21year-old -0-1-2 at 32 and 6 admitted for observation for  labor. She has received 2 doses of magnesium. SVE has remained unchanged and penicillin and magnesium were discontinued yesterday. This morning she reports left sided abdominal pain, however she denies contractions since yesterday afternoon. She denies loss of fluid, vaginal bleeding, and endorses good fetal movement.     Objective:   Vitals:   Temp:  [97.9 °F (36.6 °C)-98.1 °F (36.7 °C)] 98 °F (36.7 °C)  HR:  [] 74  Resp:  [16-20] 20  BP: (103-131)/(59-76) 105/59     Physical Exam    Fetal Assessment:  FHT: 120/moderate variability/15x15 accelerations/no decelerations; reactive  [place]: no contractions     Lab, Imaging and other studies: I have personally reviewed pertinent reports.       Lab Results   Component Value Date    WBC 10.74 (H) 08/22/2023    HGB 11.3 (L) 08/22/2023    HCT 35.2 08/22/2023    MCV 89 08/22/2023     08/22/2023       No results found for: "GLUCOSE", "CALCIUM", "NA", "K", "CO2", "CL", "BUN", "CREATININE"    No results found for: "ALT", "AST", "GGT", "ALKPHOS", "BILITOT"    Mohit Linn MD  Obstetrics & Gynecology, PGY2  8/24/2023, 6:06 AM

## 2023-08-24 NOTE — PLAN OF CARE
Problem: PAIN - ADULT  Goal: Verbalizes/displays adequate comfort level or baseline comfort level  Description: Interventions:  - Encourage patient to monitor pain and request assistance  - Assess pain using appropriate pain scale  - Administer analgesics based on type and severity of pain and evaluate response  - Implement non-pharmacological measures as appropriate and evaluate response  - Consider cultural and social influences on pain and pain management  - Notify physician/advanced practitioner if interventions unsuccessful or patient reports new pain  8/24/2023 1439 by Isabel Lundberg RN  Outcome: Adequate for Discharge  8/24/2023 0737 by Isabel Lundberg RN  Outcome: Progressing     Problem: INFECTION - ADULT  Goal: Absence or prevention of progression during hospitalization  Description: INTERVENTIONS:  - Assess and monitor for signs and symptoms of infection  - Monitor lab/diagnostic results  - Monitor all insertion sites, i.e. indwelling lines, tubes, and drains  - Monitor endotracheal if appropriate and nasal secretions for changes in amount and color  - [place] appropriate cooling/warming therapies per order  - Administer medications as ordered  - Instruct and encourage patient and family to use good hand hygiene technique  - Identify and instruct in appropriate isolation precautions for identified infection/condition  8/24/2023 1439 by Isabel Lundberg RN  Outcome: Adequate for Discharge  8/24/2023 0737 by Isabel Lundberg RN  Outcome: Progressing  Goal: Absence of fever/infection during neutropenic period  Description: INTERVENTIONS:  - Monitor WBC    8/24/2023 1439 by Isabel Lundberg RN  Outcome: Adequate for Discharge  8/24/2023 0737 by Isabel Lundberg RN  Outcome: Progressing     Problem: SAFETY ADULT  Goal: Patient will remain free of falls  Description: INTERVENTIONS:  - Educate patient/family on patient safety including physical limitations  - Instruct patient to call for assistance with activity   - Consult OT/PT to assist with strengthening/mobility   - Keep Call bell within reach  - Keep bed low and locked with side rails adjusted as appropriate  - Keep care items and personal belongings within reach  - Initiate and maintain comfort rounds  - Make Fall Risk Sign visible to staff  - Apply yellow socks and bracelet for high fall risk patients  - Consider moving patient to room near nurses station  8/24/2023 1439 by Maurisio Perkins RN  Outcome: Adequate for Discharge  8/24/2023 0737 by Maurisio Perkins RN  Outcome: Progressing  Goal: Maintain or return to baseline ADL function  Description: INTERVENTIONS:  -  Assess patient's ability to carry out ADLs; assess patient's baseline for ADL function and identify physical deficits which impact ability to perform ADLs (bathing, care of mouth/teeth, toileting, grooming, dressing, etc.)  - Assess/evaluate cause of self-care deficits   - Assess range of motion  - Assess patient's mobility; develop plan if impaired  - Assess patient's need for assistive devices and provide as appropriate  - Encourage maximum independence but intervene and supervise when necessary  - Involve family in performance of ADLs  - Assess for home care needs following discharge   - Consider OT consult to assist with ADL evaluation and planning for discharge  - Provide patient education as appropriate  8/24/2023 1439 by Maurisio Perkins RN  Outcome: Adequate for Discharge  8/24/2023 0737 by Maurisio Perkins RN  Outcome: Progressing  Goal: Maintains/Returns to pre admission functional level  Description: INTERVENTIONS:  - Perform BMAT or MOVE assessment daily.   - Set and communicate daily mobility goal to care team and patient/family/caregiver.    - Collaborate with rehabilitation services on mobility goals if consulted  - Out of bed for toileting  - Record patient progress and toleration of activity level   8/24/2023 1439 by Callum Garcia RN  Outcome: Adequate for Discharge  8/24/2023 2923 by Callum Garcia RN  Outcome: Progressing     Problem: Knowledge Deficit  Goal: Patient/family/caregiver demonstrates understanding of disease process, treatment plan, medications, and discharge instructions  Description: Complete learning assessment and assess knowledge base. Interventions:  - Provide teaching at level of understanding  - Provide teaching via preferred learning methods  8/24/2023 1439 by Callum aGrcia RN  Outcome: Adequate for Discharge  8/24/2023 0737 by Callum Garcia RN  Outcome: Progressing     Problem: DISCHARGE PLANNING  Goal: Discharge to home or other facility with appropriate resources  Description: INTERVENTIONS:  - Identify barriers to discharge w/patient and caregiver  - Arrange for needed discharge resources and transportation as appropriate  - Identify discharge learning needs (meds, wound care, etc.)  - Arrange for interpretive services to assist at discharge as needed  - Refer to Case Management Department for coordinating discharge planning if the patient needs post-hospital services based on physician/advanced practitioner order or complex needs related to functional status, cognitive ability, or social support system  8/24/2023 1439 by Callum Garcia RN  Outcome: Adequate for Discharge  8/24/2023 0737 by Callum Garcia RN  Outcome: Progressing     Problem: Nutrition/Hydration-ADULT  Goal: Nutrient/Hydration intake appropriate for improving, restoring or maintaining nutritional needs  Description: Monitor and assess patient's nutrition/hydration status for malnutrition. Collaborate with interdisciplinary team and initiate plan and interventions as ordered. Monitor patient's weight and dietary intake as ordered or per policy. Utilize nutrition screening tool and intervene as necessary.  Determine patient's food preferences and provide high-protein, high-caloric foods as appropriate.      INTERVENTIONS:  - Monitor oral intake, urinary output, labs, and treatment plans  - Assess nutrition and hydration status and recommend course of action  - Evaluate amount of meals eaten  - Assist patient with eating if necessary   - Allow adequate time for meals  - Recommend/ encourage appropriate diets, oral nutritional supplements, and vitamin/mineral supplements  - Order, calculate, and assess calorie counts as needed  - Recommend, monitor, and adjust tube feedings and TPN/PPN based on assessed needs  - Assess need for intravenous fluids  - Provide specific nutrition/hydration education as appropriate  - Include patient/family/caregiver in decisions related to nutrition  8/24/2023 1439 by Twin Hendrix RN  Outcome: Adequate for Discharge  8/24/2023 0737 by Twin Hendrix RN  Outcome: Progressing     Problem: ANTEPARTUM  Goal: Maintain pregnancy as long as maternal and/or fetal condition is stable  Description: INTERVENTIONS:  - Maternal surveillance  - Fetal surveillance  - Monitor uterine activity  - Medications as ordered  - Bedrest  8/24/2023 1439 by Twin Hendrix RN  Outcome: Adequate for Discharge  8/24/2023 0737 by Twin Hendrix RN  Outcome: Progressing

## 2023-08-24 NOTE — PLAN OF CARE
Problem: PAIN - ADULT  Goal: Verbalizes/displays adequate comfort level or baseline comfort level  Description: Interventions:  - Encourage patient to monitor pain and request assistance  - Assess pain using appropriate pain scale  - Administer analgesics based on type and severity of pain and evaluate response  - Implement non-pharmacological measures as appropriate and evaluate response  - Consider cultural and social influences on pain and pain management  - Notify physician/advanced practitioner if interventions unsuccessful or patient reports new pain  Outcome: Progressing     Problem: INFECTION - ADULT  Goal: Absence or prevention of progression during hospitalization  Description: INTERVENTIONS:  - Assess and monitor for signs and symptoms of infection  - Monitor lab/diagnostic results  - Monitor all insertion sites, i.e. indwelling lines, tubes, and drains  - Monitor endotracheal if appropriate and nasal secretions for changes in amount and color  - [place] appropriate cooling/warming therapies per order  - Administer medications as ordered  - Instruct and encourage patient and family to use good hand hygiene technique  - Identify and instruct in appropriate isolation precautions for identified infection/condition  Outcome: Progressing  Goal: Absence of fever/infection during neutropenic period  Description: INTERVENTIONS:  - Monitor WBC    Outcome: Progressing     Problem: SAFETY ADULT  Goal: Patient will remain free of falls  Description: INTERVENTIONS:  - Educate patient/family on patient safety including physical limitations  - Instruct patient to call for assistance with activity   - Consult OT/PT to assist with strengthening/mobility   - Keep Call bell within reach  - Keep bed low and locked with side rails adjusted as appropriate  - Keep care items and personal belongings within reach  - Initiate and maintain comfort rounds  - Make Fall Risk Sign visible to staff  - Apply yellow socks and bracelet for high fall risk patients  - Consider moving patient to room near nurses station  Outcome: Progressing  Goal: Maintain or return to baseline ADL function  Description: INTERVENTIONS:  -  Assess patient's ability to carry out ADLs; assess patient's baseline for ADL function and identify physical deficits which impact ability to perform ADLs (bathing, care of mouth/teeth, toileting, grooming, dressing, etc.)  - Assess/evaluate cause of self-care deficits   - Assess range of motion  - Assess patient's mobility; develop plan if impaired  - Assess patient's need for assistive devices and provide as appropriate  - Encourage maximum independence but intervene and supervise when necessary  - Involve family in performance of ADLs  - Assess for home care needs following discharge   - Consider OT consult to assist with ADL evaluation and planning for discharge  - Provide patient education as appropriate  Outcome: Progressing  Goal: Maintains/Returns to pre admission functional level  Description: INTERVENTIONS:  - Perform BMAT or MOVE assessment daily.   - Set and communicate daily mobility goal to care team and patient/family/caregiver. - Collaborate with rehabilitation services on mobility goals if consulted  - Out of bed for toileting  - Record patient progress and toleration of activity level   Outcome: Progressing     Problem: Knowledge Deficit  Goal: Patient/family/caregiver demonstrates understanding of disease process, treatment plan, medications, and discharge instructions  Description: Complete learning assessment and assess knowledge base.   Interventions:  - Provide teaching at level of understanding  - Provide teaching via preferred learning methods  Outcome: Progressing     Problem: DISCHARGE PLANNING  Goal: Discharge to home or other facility with appropriate resources  Description: INTERVENTIONS:  - Identify barriers to discharge w/patient and caregiver  - Arrange for needed discharge resources and transportation as appropriate  - Identify discharge learning needs (meds, wound care, etc.)  - Arrange for interpretive services to assist at discharge as needed  - Refer to Case Management Department for coordinating discharge planning if the patient needs post-hospital services based on physician/advanced practitioner order or complex needs related to functional status, cognitive ability, or social support system  Outcome: Progressing     Problem: Nutrition/Hydration-ADULT  Goal: Nutrient/Hydration intake appropriate for improving, restoring or maintaining nutritional needs  Description: Monitor and assess patient's nutrition/hydration status for malnutrition. Collaborate with interdisciplinary team and initiate plan and interventions as ordered. Monitor patient's weight and dietary intake as ordered or per policy. Utilize nutrition screening tool and intervene as necessary. Determine patient's food preferences and provide high-protein, high-caloric foods as appropriate.      INTERVENTIONS:  - Monitor oral intake, urinary output, labs, and treatment plans  - Assess nutrition and hydration status and recommend course of action  - Evaluate amount of meals eaten  - Assist patient with eating if necessary   - Allow adequate time for meals  - Recommend/ encourage appropriate diets, oral nutritional supplements, and vitamin/mineral supplements  - Order, calculate, and assess calorie counts as needed  - Recommend, monitor, and adjust tube feedings and TPN/PPN based on assessed needs  - Assess need for intravenous fluids  - Provide specific nutrition/hydration education as appropriate  - Include patient/family/caregiver in decisions related to nutrition  Outcome: Progressing     Problem: ANTEPARTUM  Goal: Maintain pregnancy as long as maternal and/or fetal condition is stable  Description: INTERVENTIONS:  - Maternal surveillance  - Fetal surveillance  - Monitor uterine activity  - Medications as ordered  - Bedrest  Outcome: Progressing

## 2023-08-24 NOTE — PLAN OF CARE
Problem: BIRTH - VAGINAL/ SECTION  Goal: Fetal and maternal status remain reassuring during the birth process  Description: INTERVENTIONS:  - Monitor vital signs  - Monitor fetal heart rate  - Monitor uterine activity  - Monitor labor progression (vaginal delivery)  - DVT prophylaxis  - Antibiotic prophylaxis  Outcome: Progressing  Goal: Emotionally satisfying birthing experience for mother/fetus  Description: Interventions:  - Assess, plan, implement and evaluate the nursing care given to the patient in labor  - [place]ocate the philosophy that each childbirth experience is a unique experience and support the family's chosen level of involvement and control during the labor process   - Actively participate in both the patient's and family's teaching of the birth process  - Consider cultural, Jehovah's witness and age-specific factors and plan care for the patient in labor  Outcome: Progressing     Problem: PAIN - ADULT  Goal: Verbalizes/displays adequate comfort level or baseline comfort level  Description: Interventions:  - Encourage patient to monitor pain and request assistance  - Assess pain using appropriate pain scale  - Administer analgesics based on type and severity of pain and evaluate response  - Implement non-pharmacological measures as appropriate and evaluate response  - Consider cultural and social influences on pain and pain management  - Notify physician/advanced practitioner if interventions unsuccessful or patient reports new pain  Outcome: Progressing     Problem: INFECTION - ADULT  Goal: Absence or prevention of progression during hospitalization  Description: INTERVENTIONS:  - Assess and monitor for signs and symptoms of infection  - Monitor lab/diagnostic results  - Monitor all insertion sites, i.e. indwelling lines, tubes, and drains  - Monitor endotracheal if appropriate and nasal secretions for changes in amount and color  - [place] appropriate cooling/warming therapies per order  - Administer medications as ordered  - Instruct and encourage patient and family to use good hand hygiene technique  - Identify and instruct in appropriate isolation precautions for identified infection/condition  Outcome: Progressing  Goal: Absence of fever/infection during neutropenic period  Description: INTERVENTIONS:  - Monitor WBC    Outcome: Progressing     Problem: SAFETY ADULT  Goal: Patient will remain free of falls  Description: INTERVENTIONS:  - Educate patient/family on patient safety including physical limitations  - Instruct patient to call for assistance with activity   - Consult OT/PT to assist with strengthening/mobility   - Keep Call bell within reach  - Keep bed low and locked with side rails adjusted as appropriate  - Keep care items and personal belongings within reach  - Initiate and maintain comfort rounds  - Make Fall Risk Sign visible to staff  - Apply yellow socks and bracelet for high fall risk patients  - Consider moving patient to room near nurses station  Outcome: Progressing  Goal: Maintain or return to baseline ADL function  Description: INTERVENTIONS:  -  Assess patient's ability to carry out ADLs; assess patient's baseline for ADL function and identify physical deficits which impact ability to perform ADLs (bathing, care of mouth/teeth, toileting, grooming, dressing, etc.)  - Assess/evaluate cause of self-care deficits   - Assess range of motion  - Assess patient's mobility; develop plan if impaired  - Assess patient's need for assistive devices and provide as appropriate  - Encourage maximum independence but intervene and supervise when necessary  - Involve family in performance of ADLs  - Assess for home care needs following discharge   - Consider OT consult to assist with ADL evaluation and planning for discharge  - Provide patient education as appropriate  Outcome: Progressing  Goal: Maintains/Returns to pre admission functional level  Description: INTERVENTIONS:  - Perform BMAT or MOVE assessment daily.   - Set and communicate daily mobility goal to care team and patient/family/caregiver. - Collaborate with rehabilitation services on mobility goals if consulted  - Out of bed for toileting  - Record patient progress and toleration of activity level   Outcome: Progressing     Problem: Knowledge Deficit  Goal: Patient/family/caregiver demonstrates understanding of disease process, treatment plan, medications, and discharge instructions  Description: Complete learning assessment and assess knowledge base. Interventions:  - Provide teaching at level of understanding  - Provide teaching via preferred learning methods  Outcome: Progressing     Problem: DISCHARGE PLANNING  Goal: Discharge to home or other facility with appropriate resources  Description: INTERVENTIONS:  - Identify barriers to discharge w/patient and caregiver  - Arrange for needed discharge resources and transportation as appropriate  - Identify discharge learning needs (meds, wound care, etc.)  - Arrange for interpretive services to assist at discharge as needed  - Refer to Case Management Department for coordinating discharge planning if the patient needs post-hospital services based on physician/advanced practitioner order or complex needs related to functional status, cognitive ability, or social support system  Outcome: Progressing     Problem: Nutrition/Hydration-ADULT  Goal: Nutrient/Hydration intake appropriate for improving, restoring or maintaining nutritional needs  Description: Monitor and assess patient's nutrition/hydration status for malnutrition. Collaborate with interdisciplinary team and initiate plan and interventions as ordered. Monitor patient's weight and dietary intake as ordered or per policy. Utilize nutrition screening tool and intervene as necessary. Determine patient's food preferences and provide high-protein, high-caloric foods as appropriate.      INTERVENTIONS:  - Monitor oral intake, urinary output, labs, and treatment plans  - Assess nutrition and hydration status and recommend course of action  - Evaluate amount of meals eaten  - Assist patient with eating if necessary   - Allow adequate time for meals  - Recommend/ encourage appropriate diets, oral nutritional supplements, and vitamin/mineral supplements  - Order, calculate, and assess calorie counts as needed  - Recommend, monitor, and adjust tube feedings and TPN/PPN based on assessed needs  - Assess need for intravenous fluids  - Provide specific nutrition/hydration education as appropriate  - Include patient/family/caregiver in decisions related to nutrition  Outcome: Progressing

## 2023-08-31 ENCOUNTER — ROUTINE PRENATAL (OUTPATIENT)
Dept: OBGYN CLINIC | Facility: CLINIC | Age: 23
End: 2023-08-31

## 2023-08-31 VITALS
WEIGHT: 210.6 LBS | DIASTOLIC BLOOD PRESSURE: 84 MMHG | SYSTOLIC BLOOD PRESSURE: 129 MMHG | BODY MASS INDEX: 32.98 KG/M2 | HEART RATE: 93 BPM

## 2023-08-31 DIAGNOSIS — Z34.93 PRENATAL CARE IN THIRD TRIMESTER: Primary | ICD-10-CM

## 2023-08-31 DIAGNOSIS — Z3A.32 32 WEEKS GESTATION OF PREGNANCY: ICD-10-CM

## 2023-08-31 LAB
DME PARACHUTE DELIVERY DATE REQUESTED: NORMAL
DME PARACHUTE ITEM DESCRIPTION: NORMAL
DME PARACHUTE ORDER STATUS: NORMAL
DME PARACHUTE SUPPLIER NAME: NORMAL
DME PARACHUTE SUPPLIER PHONE: NORMAL

## 2023-08-31 PROCEDURE — 99213 OFFICE O/P EST LOW 20 MIN: CPT | Performed by: NURSE PRACTITIONER

## 2023-08-31 NOTE — PATIENT INSTRUCTIONS
Dong por garber confianza en nuestro equipo. Kris Kevin y agradecemos amelie comentarios. Si recibe everett encuesta nuestra, tómese unos momentos para informarnos cómo estamos. AVE [address]       El Tercer Trimestre  (28-42 semanas)   GARBER BEBÉ   ·        garber bebé chupa garber dedo ahora! ·        garber bebé puede oír voces y responder al tacto. .. habla con Elijah Lu!!   ·        el cerebro de garber bebé crece y se desarrolla más en los últimos 2 meses de embarazo   ·        [place] y [place] vinicio del bebé son [place] y flexibles para que quepan por el canal del parto   ·        los movimientos del bebé cambian hacia el final del embarazo porque hay menos espacio para patear y estiramientos en tu vientre   ·        los pulmones del bebé no están completamente desarrollados y totalmente preparados para respirar por amelie propios hasta el último 3-4 semanas antes de garber fecha de vencimiento     TU CUERPO   ·        garber vientre está creciendo mucho ahora   ·        será más difícil dormir esmer de noche o ser tan activos chico eres generalmente   ·        puede sudar más de lo habitual   ·        serás más desequilibrado. .. tenga cuidado de no caer! ·        Usted puede desarrollar hemorroides (qué pueden ser dolorosas y hacen difícil sentarse)   ·        los dos últimos meses del embarazo puede ser muy incómodos con wally de [place], wally de [place] y ardor de estómago   ·        Puedes empezar a tener contracciones. .. mientras son irregulares y [place] de 5 por hora, esto es everett parte normal de garber cuerpo a punto de tener un bebé   ·        el alicia uterino puede empezar a dilatar y abrir [place]. .. para estar listo para el parto   ·        Usted puede encontrarse que necesitan hacer orinar muy a menudo. .. porque el bebé está presionando mucho la vejiga   ·        Usted puede quedarse corta de respiracion más rápido de lo feliz          CUENTAS DEL RETROCESO FETAL    En el tercer trimestre (después de 28 semanas de gestación) deben realizar patada fetal cuentas cada día. [place] bebé debe moverse al menos 10 veces en 2 horas scotty un tiempo [place], everett vez al día. Elegir el atime del día cuando el bebé es [place]. Trate de hacerlo a la misma hora cada día. Entrar en everett posición cómoda y luego escribir el tiempo que tu bebé se mueve sherri. Cuenta cada movimiento hasta que el bebé se mueve 10 veces. Estos movimientos incluyen patadas, puñetazos, codazos, revolotea o rollos. [place] puede tardar entre 5 minutos a 2 horas. Anote el tiempo que sientes movimiento 10 del bebé. Si ha pasado 2 horas y tu bebé no se ha movido al menos 10 veces, usted debe llamar a la oficina de inmediato. [facility] a [phone]:  Xiomara Peters que llamar inmediatamente si tengo incluso everett pequeña cantidad de LIQUIDO de mi vagina, con o sin contracciones. * Tengo que llamar si estoy SANGRADO de mi vagina. * Tengo que llamar si tengo COLICOS que continúa después de beber 2 ó 3 vasos de agua y [place] en mi lado scotty everett hora y que se siente chico que estoy teniendo un período. * Tengo que llamar si siento CONTRACCIONES más de 4 veces en everett hora quando siento que mi maryan se mantiene dura después de que trato de beber 2-3 vasos del agua y [place] en mi lado scotty evreett hora. * Tengo que llamar si сергей un cambio de mi FLUJO vaginal.   * Tengo que llamar si siento la [place] que siente que el bebé aprieta en mi vagina y dura más de [place]. * Tengo que llamar si tengo DOLOR BAJO DE LA ESPALDA que es nueva y está cerca de mi cóccix. Puede entrar y salir varias veces scotty everett hora o quedarse allí constantemente. LA PRE-ECLAMPSIA    ¿Qué es? La preeclampsia es everett enfermedad grave que puede ocurrir scotty el embarazo se relaciona con la presión arterial heriberto. Le puede pasar a cualquier brandi. ¿Por qué Yes importarme?  [place] preeclampsia tienen riesgos graves pueden incluir convulsiones, trazo, daños [employer], nacimiento prematuro de madsen bebé. En los [employer] [place], puede causar la muerte de la madre y madsen bebé. ¿Qué jordy pagar Laverne Chiquito? Signos y síntomas de la preeclampsia pueden incluir:   * Inflamación severa dimple de la roxann o las   * Dolor de teodoro todavía presente después de josey tomado Tylenol   * Effie manchas o cambios en [place]   * Dolor en la parte superior del abdomen o hombro   * [place] náuseas y vómitos (en la segunda mitad del embarazo)   * Aumento de peso repentino   * Dificultad para respirar     ¿Qué jordy hacer? Si usted experimenta alguno de los síntomas de la preeclampsia, comuníquese con madsen proveedor de [facility]. Encontrar la preeclampsia temprana es importante para usted y madsen bebé. Bibi Sermon al [phone].           LACTANCIA MATERNA     BENEFICIOS PARA LOS BEBÉS   ·       sistemas inmunológicos más marely (menos alergias, eczema, asma y cáncer infantil)   ·       menos diarrea y estreñimiento u otras enfermedades GI   ·       menos resfriados e infecciones del oído   ·       mejor visión y dientes (menos cavidades)   ·       mejora el IQ   ·       menores tasas de diabetes y obesidad en la infancia     BENEFICIOS PARA LAS MADRES   ·        promueve la pérdida de peso más rápida después del parto   ·        daina riesgo para la depresión postparto   ·        daina riesgo para los cánceres de [place], útero y ovario   ·        daina riesgo para la osteoporosis en desarrollo con la edad   ·        siempre es más fácil que fórmula - correcto, limpio, y la temperatura adecuada   ·        menos costosa que la fórmula es gratis!!!     CLAVES PARA SHONNA LACTANCIA EXITOSA   ·        mantener bebé piel a piel hasta después de la primera alimentación evento   ·        tener a bebé en madsen cuarto con usted scotty madsen estadía en el hospital después del parto   ·        evitar cualquier botella de alimentación (a menos que sea médicamente necesario)   ·        limitar el uso de chupones y pañales   ·        Pida ayuda si usted está teniendo problemas. .. consultores de lactancia (que se especializan en la lactancia) están disponibles para ayudarle a   ·        everett dieta saludable para mamá. .. comiendo everett variedad de alimentos y raciones con moderación     COSAS QUE DEBES SABER SOBRE LA LACTANCIA   ·        mayoría de los medicamentos se considera compatible con la lactancia materna por [address] [place] consultarlo con madsen médico o en lactancia antes de elinor un medicamento nuevo. .. para estar seguro es seguro   ·        alcohol (cerveza, vino, licor) puede transmitirse de la madre al bebé a través de la [place]. .. un ocasional, social bebida es considerado aceptable por [facility]. .. más que eso deben evitarse   ·        la lactancia materna es NO es un método para evitar embarazo   ·        nicotina (cigarrillos) puede pasar de la madre al bebé a través de la [place]. .. sin embargo, para las [employer], es aún más saludable para amamantar que use la fórmula   ·        cafeína debería limitarse a 1-3 tazas por día. .. incluye café, refrescos, bebidas energéticas           MASAJE EN LA CLEM PERINEAL O VAGINAL    Que puedo hacer ahora para reducir las probabilidades de desgarro scotty el parto? Masaje alrededor del orificio de la vagina realizado por usted misma (o por madsen ty). El masaje en esta clem, ya sea antes del parto o scotty la segunda etapa del parto, [place] reducir la probabilidad de desgarro perineal scotty el parto. Asimismo, el uso de compresas tibias en el perineo scotty la etapa expulsiva del parto puede reducir la pravedad del desparro. [place] sucedera scotty la etapa expulsiva del parto. En casa tambien puede reducir las probabilidades de sufrir lesiones durnate el parto de con masajes en la celm perineal.    Erlin Pachecos?   [facility] embarazadas a partir de, [facility], las 34 semanas de [place]. Cuando? Usted o madsen ty deben hacer masajes en la yeimy vaginal marielena 5 a 10 minutos de 1 a 4 veces por semana. [place]? Use everett mezcla de agua y aceite de [place], kumar u shields con 1 o 2 dedos (lady la comodidad). Inserte los dedos en la vagina entre 3 y 5cm. Aplique presion general hacia abajo y [employer] costados marielena 5 a [address] 3 y las 9 horas, de 1 a 4 veces por semana. SENALES MARIELENA EL EMBARAZO  Llame a nuestra oficina al [phone] para cualquiera de los siguientes:    1. Sangrado vaginal  2. Dolor abdominal dolores que no desaparece. 3. Fiebre (más de 100.4 y no se cyrus con Tylenol)  4. Vómitos persistentes que case más de 24 horas. 5. dolor de pecho  6. Dolor o ardor al orinar. 7. Dolor de teodoro severo que no se resuelve con Tylenol  8. Visión borrosa o tre puntos en madsen visión. 9. Hinchazón repentina de madsen roxann o dimple. 10. Enrojecimiento, hinchazón o dolor en everett pierna. 11. Un aumento de peso repentino en pocos días. 12. Contar los movimientos fetales del bebé. (después de 28 semanas o el sexto mes de embarazo)  15. Everett pérdida de líquido acuoso de la vagina: puede ser un chorro, un goteo o everett humedad continua  14. Después de 20 semanas de embarazo, calambres rítmicos (más de 4 por hora) o menstruales chico dolor bajo / Jose Luis Terrie MARIELENA EL EMBARAZO    TDAP  La tos ferina (o tos [place]) puede ser grave para cualquier persona, nay para madsen recién nacido, puede ser mortal. Hasta 20 recién nacidos mueren cada año en los [place] debido a la tos [place].  Aproximadamente la mitad de los bebés menores de 1 año de edad que contraen tos ferina necesitan tratamiento en el hospital. Cuanto más joven es el bebé cuando él o gonzalo son la tos Landrum Kane probable es que él o gonzalo tendrá que ser tratado en un hospital. Cuando usted recibe la vacuna contra la tos ferina (Tdap) scotty madsen embarazo, madsen cuerpo creará anticuerpos protectores y algunos de ellos pasan a madsen bebé antes de nacer. Estos anticuerpos pueden ayudar a proteger a madsen bebé contraigan la tos ferina hasta que tienen edad suficiente para recibir la vacuna ellos mismos (generalmente alrededor de 6 meses de edad). INFLUENZA  Cambios en las funciones inmunológica, del corazón y pulmón scotty el embarazo te hacen más susceptible a padecer seriamente enfermo de la gripe. Coger la gripe también aumenta las posibilidades de problemas graves para madsen bebé en desarrollo, incluyendo la entrega y el trabajo de Adriane. Se recomienda que todas las mujeres que están embarazadas scotty la temporada de gripe deben recibir everett vacuna contra la influenza.

## 2023-08-31 NOTE — PROGRESS NOTES
Linda Bautista presents today for routine OB visit at [address] [place] [address]. Blood Pressure: 129/84  Wt=95.5 kg (210 lb 9.6 oz); Body mass index is 32.98 kg/m².; TWG=5.715 kg (12 lb 9.6 oz)  Fetal Heart Rate: 141; Fundal Height (cm): 33 cm  Abdomen: gravid, soft, non-tender. She reports no complaints. Reports rare mild uterine contractions. Denies vaginal bleeding or leaking of fluid. Reports adequate fetal movement of at least 10 movements in 2 hours once daily. Needs to be scheduled for fetal growth ultrasound w/MFM in 4 weeks. We will assist her with scheduling. Reviewed premature labor precautions and fetal kick counts. Advised to continue medications and return in 2 weeks. Current Outpatient Medications   Medication Instructions   • ferrous sulfate 324 mg, Oral, Daily before breakfast   • Prenatal Vit-Fe Fumarate-FA (Prenatal Plus Vitamin/Mineral) 27-1 MG TABS 1 tablet, Oral, Daily       Laboratory workup: initial OB & 28 week labs (done 23)    Genetic Screening: too late at presentation for screening    Vaccinations: influenza (will offer during flu season);  Tdap (given 23); COVID-19 (rec'd x2 doses per patient report)    Postpartum contraception: desires Nexplanon    Fetal Ultrasounds:  23 (31w5d) no previa, EFW=66%, AC=69%, GAIL=10.3cm, steffanie WNL w/missed views      G4 Problems (from 23 to present)     Problem Noted Resolved     labor 2023 by Griffith Galeazzi, MD No    Overview Addendum 2023 11:39 AM by Linn Posadas [address] [place] [address]     Hospitalized -  Cervix stable @4.5/60/-2  Rec'd Betamethasone 23 & 23         Not immune to hepatitis B virus 8/10/2023 by AVE Toussaint No    Overview Signed 8/10/2023  1:10 PM by Linn Posadas [address] [place] [address]     Refer to PCP         Maternal anemia 2023 by AVE Toussaint No    Overview Addendum 2023 11:22 AM by Duard Breath, CRNP     Needs Fe supplement - taking

## 2023-09-07 PROCEDURE — T1002 RN SERVICES UP TO 15 MINUTES: HCPCS | Performed by: REGISTERED NURSE

## 2023-09-11 ENCOUNTER — HOSPITAL ENCOUNTER (INPATIENT)
Facility: HOSPITAL | Age: 23
LOS: 3 days | Discharge: HOME | End: 2023-09-14
Attending: OBSTETRICS & GYNECOLOGY | Admitting: OBSTETRICS & GYNECOLOGY
Payer: COMMERCIAL

## 2023-09-11 ENCOUNTER — HOSPITAL ENCOUNTER (EMERGENCY)
Facility: HOSPITAL | Age: 23
End: 2023-09-11
Attending: EMERGENCY MEDICINE | Admitting: EMERGENCY MEDICINE

## 2023-09-11 VITALS
SYSTOLIC BLOOD PRESSURE: 154 MMHG | TEMPERATURE: 98 F | RESPIRATION RATE: 20 BRPM | WEIGHT: 220.02 LBS | BODY MASS INDEX: 34.46 KG/M2 | OXYGEN SATURATION: 98 % | DIASTOLIC BLOOD PRESSURE: 90 MMHG | HEART RATE: 102 BPM

## 2023-09-11 DIAGNOSIS — O42.90: Primary | ICD-10-CM

## 2023-09-11 DIAGNOSIS — Z3A.34 34 WEEKS GESTATION OF PREGNANCY: ICD-10-CM

## 2023-09-11 DIAGNOSIS — Z30.017 ENCOUNTER FOR INITIAL PRESCRIPTION OF IMPLANTABLE SUBDERMAL CONTRACEPTIVE: ICD-10-CM

## 2023-09-11 LAB
ABO GROUP BLD: NORMAL
ALBUMIN SERPL BCP-MCNC: 3.6 G/DL (ref 3.5–5)
ALP SERPL-CCNC: 150 U/L (ref 34–104)
ALT SERPL W P-5'-P-CCNC: 22 U/L (ref 7–52)
ANION GAP SERPL CALCULATED.3IONS-SCNC: 11 MMOL/L
AST SERPL W P-5'-P-CCNC: 19 U/L (ref 13–39)
BASOPHILS # BLD AUTO: 0.02 THOUSANDS/ÂΜL (ref 0–0.1)
BASOPHILS NFR BLD AUTO: 0 % (ref 0–1)
BILIRUB SERPL-MCNC: 0.48 MG/DL (ref 0.2–1)
BLD GP AB SCN SERPL QL: NEGATIVE
BUN SERPL-MCNC: 7 MG/DL (ref 5–25)
CALCIUM SERPL-MCNC: 9.2 MG/DL (ref 8.4–10.2)
CHLORIDE SERPL-SCNC: 103 MMOL/L (ref 96–108)
CO2 SERPL-SCNC: 21 MMOL/L (ref 21–32)
CREAT SERPL-MCNC: 0.64 MG/DL (ref 0.6–1.3)
EOSINOPHIL # BLD AUTO: 0.14 THOUSAND/ÂΜL (ref 0–0.61)
EOSINOPHIL NFR BLD AUTO: 2 % (ref 0–6)
ERYTHROCYTE [DISTWIDTH] IN BLOOD BY AUTOMATED COUNT: 13 % (ref 11.6–15.1)
GFR SERPL CREATININE-BSD FRML MDRD: 126 ML/MIN/1.73SQ M
GLUCOSE SERPL-MCNC: 143 MG/DL (ref 65–140)
HCT VFR BLD AUTO: 34.6 % (ref 34.8–46.1)
HGB BLD-MCNC: 11.1 G/DL (ref 11.5–15.4)
HOLD SPECIMEN: YES
IMM GRANULOCYTES # BLD AUTO: 0.05 THOUSAND/UL (ref 0–0.2)
IMM GRANULOCYTES NFR BLD AUTO: 1 % (ref 0–2)
LYMPHOCYTES # BLD AUTO: 1.51 THOUSANDS/ÂΜL (ref 0.6–4.47)
LYMPHOCYTES NFR BLD AUTO: 20 % (ref 14–44)
MCH RBC QN AUTO: 28.2 PG (ref 26.8–34.3)
MCHC RBC AUTO-ENTMCNC: 32.1 G/DL (ref 31.4–37.4)
MCV RBC AUTO: 88 FL (ref 82–98)
MONOCYTES # BLD AUTO: 0.32 THOUSAND/ÂΜL (ref 0.17–1.22)
MONOCYTES NFR BLD AUTO: 4 % (ref 4–12)
NEUTROPHILS # BLD AUTO: 5.58 THOUSANDS/ÂΜL (ref 1.85–7.62)
NEUTS SEG NFR BLD AUTO: 73 % (ref 43–75)
NRBC BLD AUTO-RTO: 0 /100 WBCS
PLATELET # BLD AUTO: 278 THOUSANDS/UL (ref 149–390)
PMV BLD AUTO: 9.4 FL (ref 8.9–12.7)
POTASSIUM SERPL-SCNC: 3.7 MMOL/L (ref 3.5–5.3)
PROT SERPL-MCNC: 6.7 G/DL (ref 6.4–8.4)
RBC # BLD AUTO: 3.93 MILLION/UL (ref 3.81–5.12)
RH BLD: POSITIVE
SODIUM SERPL-SCNC: 135 MMOL/L (ref 135–147)
SPECIMEN EXPIRATION DATE: NORMAL
WBC # BLD AUTO: 7.62 THOUSAND/UL (ref 4.31–10.16)

## 2023-09-11 PROCEDURE — 86850 RBC ANTIBODY SCREEN: CPT | Performed by: EMERGENCY MEDICINE

## 2023-09-11 PROCEDURE — 80053 COMPREHEN METABOLIC PANEL: CPT | Performed by: EMERGENCY MEDICINE

## 2023-09-11 PROCEDURE — 99285 EMERGENCY DEPT VISIT HI MDM: CPT | Performed by: EMERGENCY MEDICINE

## 2023-09-11 PROCEDURE — NC001 PR NO CHARGE: Performed by: OBSTETRICS & GYNECOLOGY

## 2023-09-11 PROCEDURE — 36415 COLL VENOUS BLD VENIPUNCTURE: CPT | Performed by: EMERGENCY MEDICINE

## 2023-09-11 PROCEDURE — 86780 TREPONEMA PALLIDUM: CPT

## 2023-09-11 PROCEDURE — 4A1HXCZ MONITORING OF PRODUCTS OF CONCEPTION, CARDIAC RATE, EXTERNAL APPROACH: ICD-10-PCS | Performed by: STUDENT IN AN ORGANIZED HEALTH CARE EDUCATION/TRAINING PROGRAM

## 2023-09-11 PROCEDURE — 10H07YZ INSERTION OF OTHER DEVICE INTO PRODUCTS OF CONCEPTION, VIA NATURAL OR ARTIFICIAL OPENING: ICD-10-PCS | Performed by: STUDENT IN AN ORGANIZED HEALTH CARE EDUCATION/TRAINING PROGRAM

## 2023-09-11 PROCEDURE — 85025 COMPLETE CBC W/AUTO DIFF WBC: CPT | Performed by: EMERGENCY MEDICINE

## 2023-09-11 PROCEDURE — 86901 BLOOD TYPING SEROLOGIC RH(D): CPT | Performed by: EMERGENCY MEDICINE

## 2023-09-11 PROCEDURE — 86900 BLOOD TYPING SEROLOGIC ABO: CPT | Performed by: EMERGENCY MEDICINE

## 2023-09-11 PROCEDURE — 99284 EMERGENCY DEPT VISIT MOD MDM: CPT

## 2023-09-11 RX ORDER — ACETAMINOPHEN 325 MG/1
975 TABLET ORAL EVERY 6 HOURS PRN
Status: DISCONTINUED | OUTPATIENT
Start: 2023-09-11 | End: 2023-09-12

## 2023-09-11 RX ORDER — BUPIVACAINE HYDROCHLORIDE 2.5 MG/ML
30 INJECTION, SOLUTION EPIDURAL; INFILTRATION; INTRACAUDAL ONCE AS NEEDED
Status: DISCONTINUED | OUTPATIENT
Start: 2023-09-11 | End: 2023-09-12

## 2023-09-11 RX ORDER — OXYTOCIN/RINGER'S LACTATE 30/500 ML
1-30 PLASTIC BAG, INJECTION (ML) INTRAVENOUS
Status: DISCONTINUED | OUTPATIENT
Start: 2023-09-11 | End: 2023-09-12

## 2023-09-11 RX ORDER — ONDANSETRON 2 MG/ML
4 INJECTION INTRAMUSCULAR; INTRAVENOUS EVERY 6 HOURS PRN
Status: DISCONTINUED | OUTPATIENT
Start: 2023-09-11 | End: 2023-09-12

## 2023-09-11 RX ORDER — CALCIUM CARBONATE 500 MG/1
1000 TABLET, CHEWABLE ORAL DAILY PRN
Status: DISCONTINUED | OUTPATIENT
Start: 2023-09-11 | End: 2023-09-12

## 2023-09-11 RX ORDER — SODIUM CHLORIDE, SODIUM LACTATE, POTASSIUM CHLORIDE, CALCIUM CHLORIDE 600; 310; 30; 20 MG/100ML; MG/100ML; MG/100ML; MG/100ML
125 INJECTION, SOLUTION INTRAVENOUS CONTINUOUS
Status: DISCONTINUED | OUTPATIENT
Start: 2023-09-11 | End: 2023-09-12

## 2023-09-11 RX ADMIN — SODIUM CHLORIDE, SODIUM LACTATE, POTASSIUM CHLORIDE, AND CALCIUM CHLORIDE 125 ML/HR: .6; .31; .03; .02 INJECTION, SOLUTION INTRAVENOUS at 18:04

## 2023-09-11 RX ADMIN — Medication 2 MILLI-UNITS/MIN: at 18:04

## 2023-09-11 RX ADMIN — SODIUM CHLORIDE, SODIUM LACTATE, POTASSIUM CHLORIDE, AND CALCIUM CHLORIDE 125 ML/HR: .6; .31; .03; .02 INJECTION, SOLUTION INTRAVENOUS at 22:03

## 2023-09-11 RX ADMIN — SODIUM CHLORIDE 1000 ML: 0.9 INJECTION, SOLUTION INTRAVENOUS at 13:21

## 2023-09-11 NOTE — CONSULTS
MATERNAL CONSULTATION - NICU   Digmary RiveraReyes 21 y.o. female MRN: [id number]  Unit/Bed#: L&D 326-01 Encounter: [id number]    History of Present Illness     Consults    HPI: Maya Lieberman is a 21y.o. year old  female at 33w4d with an AVILA of 10/20/2023, by Last Menstrual Period who presents with PTL and PROM. She has the following prenatal labs: O+/HIV neg/RPR neg/HBsAg neg/GBS neg    Pregnancy complications:  labor and PROM. Fetal Complications: none. Maternal medical history and medications: anemia     Maternal social history: none. Marital status: Single.     Maternal  medications:  steroids: s/p Betamethasone on  &   Other medications: Mag and penicillin     Historical Information   Past Medical History:   Diagnosis Date   • Asthma     early childhood     Past Surgical History:   Procedure Laterality Date   • NO PAST SURGERIES       Social History     Tobacco Use   Smoking Status Never   Smokeless Tobacco Never     OB History:   # 1 - Date: 10/01/15, Sex: Female, Weight: 3175 g (7 lb), GA: 38w0d, Delivery: Vaginal, Spontaneous, Apgar1: None, Apgar5: None, Living: Living, Birth Comments: FOB1    # 2 - Date: , Sex: None, Weight: None, GA: None, Delivery: Spontaneous , Apgar1: None, Apgar5: None, Living: None, Birth Comments: FOB2    # 3 - Date: 20, Sex: Female, Weight: 2268 g (5 lb), GA: 37w0d, Delivery: Vaginal, Spontaneous, Apgar1: None, Apgar5: None, Living: Living, Birth Comments: FOB3    # 4 - Date: None, Sex: None, Weight: None, GA: None, Delivery: None, Apgar1: None, Apgar5: None, Living: None, Birth Comments: None    Family History: non-contributory    Meds/Allergies   all current active meds have been reviewed    No Known Allergies    Objective   No intake or output data in the 24 hours ending 23 1705  Invasive Devices     Peripheral Intravenous Line  Duration           Peripheral IV 23 Right Antecubital <1 day Pt notified of specific numbers and has no further questions.     Lab Results: I have personally reviewed pertinent reports. Imaging Studies: I have personally reviewed pertinent reports. EKG, Pathology, and Other Studies: I have personally reviewed pertinent reports. VTE Prophylaxis: Sequential compression device (Venodyne)     Code Status: Level 1 - Full Code  Advance Directive and Living Will:      Power of :    POLST:      Counseling / Coordination of Care  Total floor / unit time spent today 40 minutes. Greater than 50% of total time was spent with the patient and / or family counseling and / or coordination of care. A description of the counseling / coordination of care:     I had the pleasure of talking to  Ms Clarita Seaman, a 21y.o. year old  female at 33w4d with an AVILA of 10/20/2023, by Last Menstrual Period who presents with PROM and PTL. She is s/p Betamethasone and Magnesium on -. I informed the mom that NICU team will be at the delivery to resuscitate the baby. I discussed the consequences of delivery at 29 3/7 weeks gestation to the parents, which include RDS due to lung immaturity and need for respiratory support, which could be either mechanical ventilation if baby has no breathing effort, or just CPAP for good FRC, and sometimes the need for artificial surfactant if baby's oxygen requirement is high. Consequences of respiratory support such pneumothorax and pneumomediastinum were also discussed. We also discussed apnea of prematurity due to premature breathing center in the brain stem and need for caffeine citrate. We also discussed the issues of hypothermia and need to stay in [facility] for thermoregulation, Hyperbilirubinemia and need for phototherapy, feeding intolerance and incoordination. I encouraged her to breast feed. She intends to breast and bottle feed and plan to pump soon after delivery. She is aware there is donor breast milk in the NICU as long as she consents to it.  She will be allowed to formula feed as baby > 34 weeks. Other concerns of prematuriy such as anemia and possible infection were also discussed. Parents also aware of the capability to view the baby via NICU camera from home. NICU visitation policies were also discussed. She understood everything, as  was used # [id number]. All her questions were answered  ?     Assessment/Plan   Principal Problem:     premature rupture of membranes in third trimester  Active Problems:    Maternal anemia    Not immune to hepatitis B virus    34 weeks gestation of pregnancy     labor      Plan:  Expectant management as per OB/MFM  Call NICU when necessary    Consult and documentation  time = 40 minutes

## 2023-09-11 NOTE — PLAN OF CARE
Problem: ANTEPARTUM  Goal: Maintain pregnancy as long as maternal and/or fetal condition is stable  Description: INTERVENTIONS:  - Maternal surveillance  - Fetal surveillance  - Monitor uterine activity  - Medications as ordered  - Bedrest  Outcome: Progressing     Problem: BIRTH - VAGINAL/ SECTION  Goal: Fetal and maternal status remain reassuring during the birth process  Description: INTERVENTIONS:  - Monitor vital signs  - Monitor fetal heart rate  - Monitor uterine activity  - Monitor labor progression (vaginal delivery)  - DVT prophylaxis  - Antibiotic prophylaxis  Outcome: Progressing  Goal: Emotionally satisfying birthing experience for mother/fetus  Description: Interventions:  - Assess, plan, implement and evaluate the nursing care given to the patient in labor  - Advocate the philosophy that each childbirth experience is a unique experience and support the family's chosen level of involvement and control during the labor process   - Actively participate in both the patient's and family's teaching of the birth process  - Consider cultural, Episcopal and age-specific factors and plan care for the patient in labor  Outcome: Progressing     Problem: POSTPARTUM  Goal: Experiences normal postpartum course  Description: INTERVENTIONS:  - Monitor maternal vital signs  - Assess uterine involution and lochia  Outcome: Progressing  Goal: Appropriate maternal -  bonding  Description: INTERVENTIONS:  - Identify family support  - Assess for appropriate maternal/infant bonding   -Encourage maternal/infant bonding opportunities  - Referral to  or  as needed  Outcome: Progressing  Goal: Establishment of infant feeding pattern  Description: INTERVENTIONS:  - Assess breast/bottle feeding  - Refer to lactation as needed  Outcome: Progressing  Goal: Incision(s), wounds(s) or drain site(s) healing without S/S of infection  Description: INTERVENTIONS  - Assess and document dressing, incision, wound bed, drain sites and surrounding tissue  - Provide patient and family education  - Perform skin care/dressing changes every  Outcome: Progressing

## 2023-09-11 NOTE — ASSESSMENT & PLAN NOTE
Lochia WNL   Recovering well   Appropriate bowel and bladder function   Pain well controlled   Tolerating diet   Breastfeeding and bottle feeding   Ambulating without issues   No lower extremity tenderness  GBS neg   Rh pos   Baby in NICU  Nexplanon placed 9/13

## 2023-09-11 NOTE — ASSESSMENT & PLAN NOTE
Admit for PPROM: grossly ruptured on speculum exam, ferning present and nitrazine positive   SVE: 4/90/-1  IV Fluids   Clear liquid diet   Analgesia: at maternal request  Management: expectant, augmentation if unchanged at next check

## 2023-09-11 NOTE — H&P
H & P- Obstetrics   Digmary RiveraReyes 21 y.o. female MRN: [id number]  Unit/Bed#: L&D 326-01 Encounter: [id number]    Assessment: 21 y.o. [id number] at 34w3d admitted for PPROM  SVE:   FHT: reactive  EFW: 66% ; Vertex confirmed by ultrasound  GBS status: negative   Postpartum contraception plan: nexplanon, considering tubal if     Plan:   *  premature rupture of membranes in third trimester  Assessment & Plan  Admit for PPROM: grossly ruptured on speculum exam, ferning present and nitrazine positive   SVE:   IV Fluids   Clear liquid diet   Analgesia: at maternal request  Management: expectant, augmentation if unchanged at next check       labor  Assessment & Plan  Admitted for  labor at 31 weeks   SVE at the time .2  Received 2 betamethasone for fetal lung maturity  and , mag and penicillin     34 weeks gestation of pregnancy  Assessment & Plan  Intermittent fetal monitoring   NICU aware    Not immune to hepatitis B virus  Assessment & Plan  Offer vaccination after delivery     Maternal anemia  Assessment & Plan  Follow up admission CBC        Discussed case and plan w/ Dr. Abdirashid Weiner      Chief Complaint: leaking fluid    HPI: Rah Workman is a 21 y.o. [id number] with an AVILA of 10/20/2023, by Last Menstrual Period at 34w3d who is being admitted for spontaneous rupture of membranes and  labor. She denies having uterine contractions, has moderate LOF around noon today, and reports no VB. She states she has felt good FM. Maame Wall Patient Active Problem List   Diagnosis   • Maternal anemia   • Not immune to hepatitis B virus   • 34 weeks gestation of pregnancy   •  labor   •  premature rupture of membranes in third trimester       Baby complications/comments: none    Review of Systems   Constitutional: Negative for chills and fever. HENT: Negative for trouble swallowing. Eyes: Negative for visual disturbance.    Respiratory: Negative for cough, chest tightness and shortness of breath. Cardiovascular: Negative for chest pain, palpitations and leg swelling. Gastrointestinal: Negative for abdominal pain, constipation, diarrhea, nausea and vomiting. Genitourinary: Negative for difficulty urinating, dysuria, urgency and vaginal bleeding. Musculoskeletal: Negative for arthralgias and back pain. Skin: Negative for pallor. Allergic/Immunologic: Negative for environmental allergies, food allergies and immunocompromised state. Neurological: Negative for dizziness, weakness, light-headedness and headaches. Psychiatric/Behavioral: Negative for agitation. The patient is not nervous/anxious. OB Hx:  OB History    Para Term  AB Living   4 2 2 0 1 2   SAB IAB Ectopic Multiple Live Births   1 0 0 0 2      # Outcome Date GA Lbr Mario Alberto/2nd Weight Sex Delivery Anes PTL Lv   4 Current            3 Term 20 37w0d  2268 g (5 lb) F Vag-Spont   QUYEN      Birth Comments: FOB3      Complications: IUGR (intrauterine growth restriction) affecting care of mother   2 SAB      SAB         Birth Comments: FOB2   1 Term 10/01/15 38w0d  3175 g (7 lb) F Vag-Spont None N QUYEN      Birth Comments: FOB1       Past Medical Hx: none       Past Surgical hx:  Past Surgical History:   Procedure Laterality Date   • NO PAST SURGERIES         Social Hx:  Alcohol use: denies  Tobacco use: denies  Other substance use: denies      No Known Allergies    Medications Prior to Admission   Medication   • ferrous sulfate 324 (65 Fe) mg   • Prenatal Vit-Fe Fumarate-FA (Prenatal Plus Vitamin/Mineral) 27-1 MG TABS       Objective:  Temp:  [98 °F (36.7 °C)-98.1 °F (36.7 °C)] 98.1 °F (36.7 °C)  HR:  [] 98  Resp:  [18-20] 18  BP: (123-154)/(81-90) 123/81  Body mass index is 34.46 kg/m². Physical Exam:  Chaperone present: [place]  Physical Exam  Constitutional:       Appearance: Normal appearance.    Genitourinary:      Vulva normal.   HENT:      Head: Normocephalic. Right Ear: External ear normal.      Left Ear: External ear normal.      Nose: Nose normal.   Eyes:      Extraocular Movements: Extraocular movements intact. Cardiovascular:      Rate and Rhythm: Normal rate and regular rhythm. Pulses: Normal pulses. Heart sounds: Normal heart sounds. Pulmonary:      Effort: Pulmonary effort is normal.      Breath sounds: Normal breath sounds. Abdominal:      General: Bowel sounds are normal.   Musculoskeletal:         General: Normal range of motion. Cervical back: Normal range of motion. Neurological:      General: No focal deficit present. Mental Status: She is alert and oriented to person, place, and time.    Psychiatric:         Mood and Affect: Mood normal.            FHT:   135bpm baseline, moderate variability, 15x15 accelerations, no decelerations    TOCO:    ctx absent    Lab Results   Component Value Date    WBC 7.62 09/11/2023    HGB 11.1 (L) 09/11/2023    HCT 34.6 (L) 09/11/2023     09/11/2023     Lab Results   Component Value Date    K 3.7 09/11/2023     09/11/2023    CO2 21 09/11/2023    BUN 7 09/11/2023    CREATININE 0.64 09/11/2023    AST 19 09/11/2023    ALT 22 09/11/2023     Prenatal Labs: Reviewed     Blood type: O   Antibody: Positive  GBS: Negative  HIV: Non reactive  Rubella: Immune  VDRL/RPR: Non reactive  HBsAg: Negative  Chlamydia: Negative  Gonorrhea: Negative  Diabetes 1 hour screen: 129 mg/dL  3 hour glucose: n/a  Platelets: 052  Hgb: 11.1 g/dL  >2 Midnights  INPATIENT     Dictated by Philipp SAGE, reviewed by Kelley Contreras MD  Date: 9/11/2023  Time: 3:46 PM

## 2023-09-11 NOTE — EMTALA/ACUTE CARE TRANSFER
[facility] EMERGENCY DEPARTMENT  [address] [id number] [phone]  Dept: [phone]      EMTALA TRANSFER CONSENT    NAME Digmary RiveraReyes                                         2000                              MRN [id number]    I have been informed of my rights regarding examination, treatment, and transfer   by Dr. Mackenzie Garrido DO    Benefits: Specialized equipment and/or services available at the receiving facility (Include comment)________________________    Risks: Potential for delay in receiving treatment, Potential deterioration of medical condition, Loss of IV, Increased discomfort during transfer, Possible worsening of condition or death during transfer      Consent for Transfer:  I acknowledge that my medical condition has been evaluated and explained to me by the emergency department physician or other qualified medical person and/or my attending physician, who has recommended that I be transferred to the service of  Accepting Physician: Dr. Sima Alcantar at [address] [place] [address] [address] Name, [address] : [address] [place] [address] L&D. The above potential benefits of such transfer, the potential risks associated with such transfer, and the probable risks of not being transferred have been explained to me, and I fully understand them. The doctor has explained that, in my case, the benefits of transfer outweigh the risks. I agree to be transferred. I authorize the performance of emergency medical procedures and treatments upon me in both transit and upon arrival at the receiving facility. Additionally, I authorize the release of any and all medical records to the receiving facility and request they be transported with me, if possible. I understand that the safest mode of transportation during a medical emergency is an ambulance and that the Hospital advocates the use of this mode of transport.  Risks of traveling to the receiving facility by car, including absence of medical control, life sustaining equipment, such as oxygen, and medical personnel has been explained to me and I fully understand them. (DENISA CORRECT BOX BELOW)  [  ]  I consent to the stated transfer and to be transported by ambulance/helicopter. [  ]  I consent to the stated transfer, but refuse transportation by ambulance and accept full responsibility for my transportation by car. I understand the risks of non-ambulance transfers and I exonerate the Hospital and its staff from any deterioration in my condition that results from this refusal.    X___________________________________________    DATE  23  TIME________  Signature of patient or legally responsible individual signing on patient behalf           RELATIONSHIP TO PATIENT_________________________          Provider Certification    NAME Rah Workman                                         2000                              MRN [id number]    A medical screening exam was performed on the above named patient. Based on the examination:    Condition Necessitating Transfer The encounter diagnosis was Premature labor with rupture of membranes.     Patient Condition: The patient has been stabilized such that within reasonable medical probability, no material deterioration of the patient condition or the condition of the unborn child(mony) is likely to result from the transfer    Reason for Transfer: Level of Care needed not available at this facility (OB/Gyn)    Transfer Requirements: [address] L&D   · Space available and qualified personnel available for treatment as acknowledged by    · Agreed to accept transfer and to provide appropriate medical treatment as acknowledged by       Dr. Asia Camp  · Appropriate medical records of the examination and treatment of the patient are provided at the time of transfer   [address] _______  · Transfer will be performed by qualified personnel from    and appropriate transfer equipment as required, including the use of necessary and appropriate life support measures. Provider Certification: I have examined the patient and explained the following risks and benefits of being transferred/refusing transfer to the patient/family:  General risk, such as traffic hazards, adverse weather conditions, rough terrain or turbulence, possible failure of equipment (including vehicle or aircraft), or consequences of actions of persons outside the control of the transport personnel, Unanticipated needs of medical equipment and personnel during transport, Risk of worsening condition, The possibility of a transport vehicle being unavailable      Based on these reasonable risks and benefits to the patient and/or the unborn child(mony), and based upon the information available at the time of the patient’s examination, I certify that the medical benefits reasonably to be expected from the provision of appropriate medical treatments at another medical facility outweigh the increasing risks, if any, to the individual’s medical condition, and in the case of labor to the unborn child, from effecting the transfer.     X____________________________________________ DATE 09/11/23        TIME_______      ORIGINAL - SEND TO MEDICAL RECORDS   COPY - SEND WITH PATIENT DURING TRANSFER

## 2023-09-11 NOTE — OB LABOR/OXYTOCIN SAFETY PROGRESS
Oxytocin Safety Progress Check Note - Digmary RiveraReyes 21 y.o. female MRN: [id number]    Unit/Bed#: L&D 326-01 Encounter: [id number]       Contraction Frequency (minutes): 0  Contraction Quality: Not applicable  Tachysystole: No   Cervical Dilation: 5        Cervical Effacement: 90  Fetal Station: -1  Baseline Rate: 125 bpm  Fetal Heart Rate: 125 BPM  FHR Category: Category I               Vital Signs:   Vitals:    09/11/23 1422   BP: 123/81   Pulse: 98   Resp: 18   Temp: 98.1 °F (36.7 °C)       Notes/comments: SVE with minimal change. FHT reactive and no contractions on TOCO. Plan to start pitocin for augmentation of labor.        Dr. Luis Webb aware        Femi Dotson MD 9/11/2023 4:57 PM

## 2023-09-11 NOTE — ASSESSMENT & PLAN NOTE
Admitted for  labor at 31 weeks   SVE at the time 4.5/-2  Received 2 betamethasone for fetal lung maturity  and , mag and penicillin

## 2023-09-12 LAB
BASE EXCESS BLDCOV CALC-SCNC: 0.7 MMOL/L (ref 1–9)
HCO3 BLDCOV-SCNC: 24 MMOL/L (ref 12.2–28.6)
OXYHGB MFR BLDCOV: 82.6 %
PCO2 BLDCOV: 34.2 MM HG (ref 27–43)
PH BLDCOV: 7.46 [PH] (ref 7.19–7.49)
PO2 BLDCOV: 34.5 MM HG (ref 15–45)
SAO2 % BLDCOV: 14.3 ML/DL
TREPONEMA PALLIDUM IGG+IGM AB [PRESENCE] IN SERUM OR PLASMA BY IMMUNOASSAY: NORMAL

## 2023-09-12 PROCEDURE — NC001 PR NO CHARGE: Performed by: OBSTETRICS & GYNECOLOGY

## 2023-09-12 PROCEDURE — 0JHF3HZ INSERTION OF CONTRACEPTIVE DEVICE INTO LEFT UPPER ARM SUBCUTANEOUS TISSUE AND FASCIA, PERCUTANEOUS APPROACH: ICD-10-PCS | Performed by: STUDENT IN AN ORGANIZED HEALTH CARE EDUCATION/TRAINING PROGRAM

## 2023-09-12 PROCEDURE — 88307 TISSUE EXAM BY PATHOLOGIST: CPT | Performed by: STUDENT IN AN ORGANIZED HEALTH CARE EDUCATION/TRAINING PROGRAM

## 2023-09-12 PROCEDURE — 59409 OBSTETRICAL CARE: CPT | Performed by: STUDENT IN AN ORGANIZED HEALTH CARE EDUCATION/TRAINING PROGRAM

## 2023-09-12 PROCEDURE — 82805 BLOOD GASES W/O2 SATURATION: CPT | Performed by: STUDENT IN AN ORGANIZED HEALTH CARE EDUCATION/TRAINING PROGRAM

## 2023-09-12 RX ORDER — CALCIUM CARBONATE 500 MG/1
1000 TABLET, CHEWABLE ORAL DAILY PRN
Status: DISCONTINUED | OUTPATIENT
Start: 2023-09-12 | End: 2023-09-14 | Stop reason: HOSPADM

## 2023-09-12 RX ORDER — SIMETHICONE 80 MG
80 TABLET,CHEWABLE ORAL 4 TIMES DAILY PRN
Status: DISCONTINUED | OUTPATIENT
Start: 2023-09-12 | End: 2023-09-14 | Stop reason: HOSPADM

## 2023-09-12 RX ORDER — ONDANSETRON 2 MG/ML
4 INJECTION INTRAMUSCULAR; INTRAVENOUS EVERY 8 HOURS PRN
Status: DISCONTINUED | OUTPATIENT
Start: 2023-09-12 | End: 2023-09-14 | Stop reason: HOSPADM

## 2023-09-12 RX ORDER — OXYTOCIN/RINGER'S LACTATE 30/500 ML
250 PLASTIC BAG, INJECTION (ML) INTRAVENOUS CONTINUOUS
Status: ACTIVE | OUTPATIENT
Start: 2023-09-12 | End: 2023-09-12

## 2023-09-12 RX ORDER — DIAPER,BRIEF,INFANT-TODD,DISP
1 EACH MISCELLANEOUS DAILY PRN
Status: DISCONTINUED | OUTPATIENT
Start: 2023-09-12 | End: 2023-09-14 | Stop reason: HOSPADM

## 2023-09-12 RX ORDER — DIPHENHYDRAMINE HCL 25 MG
25 TABLET ORAL EVERY 6 HOURS PRN
Status: DISCONTINUED | OUTPATIENT
Start: 2023-09-12 | End: 2023-09-14 | Stop reason: HOSPADM

## 2023-09-12 RX ORDER — DOCUSATE SODIUM 100 MG/1
100 CAPSULE, LIQUID FILLED ORAL 2 TIMES DAILY
Status: DISCONTINUED | OUTPATIENT
Start: 2023-09-12 | End: 2023-09-14 | Stop reason: HOSPADM

## 2023-09-12 RX ORDER — ACETAMINOPHEN 325 MG/1
650 TABLET ORAL EVERY 4 HOURS PRN
Status: DISCONTINUED | OUTPATIENT
Start: 2023-09-12 | End: 2023-09-14 | Stop reason: HOSPADM

## 2023-09-12 RX ORDER — IBUPROFEN 600 MG/1
600 TABLET ORAL EVERY 6 HOURS
Status: DISCONTINUED | OUTPATIENT
Start: 2023-09-12 | End: 2023-09-13 | Stop reason: SDUPTHER

## 2023-09-12 RX ADMIN — IBUPROFEN 600 MG: 600 TABLET ORAL at 21:03

## 2023-09-12 RX ADMIN — ACETAMINOPHEN 325MG 650 MG: 325 TABLET ORAL at 17:39

## 2023-09-12 RX ADMIN — SODIUM CHLORIDE, SODIUM LACTATE, POTASSIUM CHLORIDE, AND CALCIUM CHLORIDE 999 ML/HR: .6; .31; .03; .02 INJECTION, SOLUTION INTRAVENOUS at 02:05

## 2023-09-12 RX ADMIN — DOCUSATE SODIUM 100 MG: 100 CAPSULE, LIQUID FILLED ORAL at 17:39

## 2023-09-12 RX ADMIN — IBUPROFEN 600 MG: 600 TABLET ORAL at 15:19

## 2023-09-12 RX ADMIN — ACETAMINOPHEN 325MG 650 MG: 325 TABLET ORAL at 11:31

## 2023-09-12 RX ADMIN — DOCUSATE SODIUM 100 MG: 100 CAPSULE, LIQUID FILLED ORAL at 08:50

## 2023-09-12 RX ADMIN — IBUPROFEN 600 MG: 600 TABLET ORAL at 03:23

## 2023-09-12 RX ADMIN — IBUPROFEN 600 MG: 600 TABLET ORAL at 08:50

## 2023-09-12 NOTE — PLAN OF CARE
Problem: POSTPARTUM  Goal: Experiences normal postpartum course  Description: INTERVENTIONS:  - Monitor maternal vital signs  - Assess uterine involution and lochia  Outcome: Progressing  Goal: Appropriate maternal -  bonding  Description: INTERVENTIONS:  - Identify family support  - Assess for appropriate maternal/infant bonding   -Encourage maternal/infant bonding opportunities  - Referral to  or  as needed  Outcome: Progressing  Goal: Establishment of infant feeding pattern  Description: INTERVENTIONS:  - Assess breast/bottle feeding  - Refer to lactation as needed  Outcome: Progressing  Goal: Incision(s), wounds(s) or drain site(s) healing without S/S of infection  Description: INTERVENTIONS  - Assess and document dressing, incision, wound bed, drain sites and surrounding tissue  - Provide patient and family education  - Perform skin care/dressing changes every   Outcome: Progressing     Problem: ANTEPARTUM  Goal: Maintain pregnancy as long as maternal and/or fetal condition is stable  Description: INTERVENTIONS:  - Maternal surveillance  - Fetal surveillance  - Monitor uterine activity  - Medications as ordered  - Bedrest  2023 by Rita Rich RN  Outcome: Completed  2023 by Rita Rich RN  Outcome: Progressing     Problem: BIRTH - VAGINAL/ SECTION  Goal: Fetal and maternal status remain reassuring during the birth process  Description: INTERVENTIONS:  - Monitor vital signs  - Monitor fetal heart rate  - Monitor uterine activity  - Monitor labor progression (vaginal delivery)  - DVT prophylaxis  - Antibiotic prophylaxis  2023 by Rita Rich RN  Outcome: Completed  2023 by Rita Rich RN  Outcome: Progressing  Goal: Emotionally satisfying birthing experience for mother/fetus  Description: Interventions:  - Assess, plan, implement and evaluate the nursing care given to the patient in labor  - Advocate the philosophy that each childbirth experience is a unique experience and support the family's chosen level of involvement and control during the labor process   - Actively participate in both the patient's and family's teaching of the birth process  - Consider cultural, Jainism and age-specific factors and plan care for the patient in labor  9/12/2023 0557 by Laura Connor RN  Outcome: Completed  9/12/2023 0557 by Laura Connor RN  Outcome: Progressing

## 2023-09-12 NOTE — DISCHARGE SUMMARY
Obstetrics Discharge Summary  Digmary RiveraReyes 21 y.o. female MRN: [id number]  Unit/Bed#: L&D 326-01 Encounter: [id number]    Admission Date: 2023     Discharge Date: ***    Admitting Attending: Pamela Corbin  Delivery Attending: Levi Gonzalez  Discharging Attending: ***    Admitting Diagnoses:   1. Pregnancy at 34w  2.  labor  3. Anemia affecting pregnancy  4. Hepatitis B nonimmune     Discharge Diagnoses:   Same, delivered  ***    Delivery  Route of Delivery: Vaginal, Spontaneous     Anesthesia: None ,   QBL: Non-Surgical QBL (mL): 51        Delivery: Vaginal, Spontaneous  at 2023  2:18 AM   Laceration: Perineal: None  Repaired? Baby's Weight: 2650 g (5 lb 13.5 oz) ; 93.48     Apgar scores: 9  and 9  at 1 and 5 minutes, respectively      Hospital course: Torito Weller is a 21 y.o. [id number] at 34wk4d. She presented to labor and delivery with PPROM. At presentation, she was 4//-1. She was already steroid complete from a previous admission. Pitocin was started for augmentation. She progressed to complete cervical dilation and began pushing. Her post-delivery course was ***complicated by ***. Her postpartum pain was well controlled with ***oral analgesics. Maternal blood type is O positive so RhoGAM was not indicated. On day of discharge, she was ambulating and able to reasonably perform all ADLs. She was voiding and had appropriate bowel function. Pain was well controlled. She was discharged home on postpartum day #*** without complications. Patient was instructed to follow up with her OBGYN as an outpatient and was given appropriate warnings to call provider if she develops signs of infection or uncontrolled pain. Complications: none apparent    Condition at discharge: {condition:10271}     Provisions for Follow-Up Care:  Please see after visit summary for information related to follow-up care and any pertinent home health orders.       Disposition: Home***    Planned Readmission: No    Discharge Medications:   Please see AVS for a complete list of discharge medications. Discharge instructions :   Please see AVS for complete discharge instructions.     ***

## 2023-09-12 NOTE — PLAN OF CARE
Problem: POSTPARTUM  Goal: Experiences normal postpartum course  Description: INTERVENTIONS:  - Monitor maternal vital signs  - Assess uterine involution and lochia  2023 by Eli Dao RN  Outcome: Progressing  2023 by Eli Dao RN  Outcome: Progressing  Goal: Appropriate maternal -  bonding  Description: INTERVENTIONS:  - Identify family support  - Assess for appropriate maternal/infant bonding   -Encourage maternal/infant bonding opportunities  - Referral to  or  as needed  2023 by Eli Dao RN  Outcome: Progressing  2023 by Eli Dao RN  Outcome: Progressing  Goal: Establishment of infant feeding pattern  Description: INTERVENTIONS:  - Assess breast/bottle feeding  - Refer to lactation as needed  2023 by Eli Dao RN  Outcome: Progressing  2023 by Eli Dao RN  Outcome: Progressing  Goal: Incision(s), wounds(s) or drain site(s) healing without S/S of infection  Description: INTERVENTIONS  - Assess and document dressing, incision, wound bed, drain sites and surrounding tissue  - Provide patient and family education  2023 by Eli Dao RN  Outcome: Progressing     Problem: PAIN - ADULT  Goal: Verbalizes/displays adequate comfort level or baseline comfort level  Description: Interventions:  - Encourage patient to monitor pain and request assistance  - Assess pain using appropriate pain scale  - Administer analgesics based on type and severity of pain and evaluate response  - Implement non-pharmacological measures as appropriate and evaluate response  - Consider cultural and social influences on pain and pain management  - Notify physician/advanced practitioner if interventions unsuccessful or patient reports new pain  Outcome: Progressing     Problem: INFECTION - ADULT  Goal: Absence or prevention of progression during hospitalization  Description: INTERVENTIONS:  - Assess and monitor for signs and symptoms of infection  - Monitor lab/diagnostic results  - Monitor all insertion sites, i.e. indwelling lines, tubes, and drains  - Monitor endotracheal if appropriate and nasal secretions for changes in amount and color  - [place] appropriate cooling/warming therapies per order  - Administer medications as ordered  - Instruct and encourage patient and family to use good hand hygiene technique  - Identify and instruct in appropriate isolation precautions for identified infection/condition  Outcome: Progressing  Goal: Absence of fever/infection during neutropenic period  Description: INTERVENTIONS:  - Monitor WBC    Outcome: Progressing     Problem: SAFETY ADULT  Goal: Patient will remain free of falls  Description: INTERVENTIONS:  - Educate patient/family on patient safety including physical limitations  - Instruct patient to call for assistance with activity   - Consult OT/PT to assist with strengthening/mobility   - Keep Call bell within reach  - Keep bed low and locked with side rails adjusted as appropriate  - Keep care items and personal belongings within reach  - Initiate and maintain comfort rounds  - Make Fall Risk Sign visible to staff  - Apply yellow socks and bracelet for high fall risk patients  - Consider moving patient to room near nurses station  Outcome: Progressing  Goal: Maintain or return to baseline ADL function  Description: INTERVENTIONS:  -  Assess patient's ability to carry out ADLs; assess patient's baseline for ADL function and identify physical deficits which impact ability to perform ADLs (bathing, care of mouth/teeth, toileting, grooming, dressing, etc.)  - Assess/evaluate cause of self-care deficits   - Assess range of motion  - Assess patient's mobility; develop plan if impaired  - Assess patient's need for assistive devices and provide as appropriate  - Encourage maximum independence but intervene and supervise when necessary  - Involve family in performance of ADLs  - Assess for home care needs following discharge   - Consider OT consult to assist with ADL evaluation and planning for discharge  - Provide patient education as appropriate  Outcome: Progressing  Goal: Maintains/Returns to pre admission functional level  Description: INTERVENTIONS:  - Perform BMAT or MOVE assessment daily.   - Set and communicate daily mobility goal to care team and patient/family/caregiver. - Collaborate with rehabilitation services on mobility goals if consulted  - Out of bed for toileting  - Record patient progress and toleration of activity level   Outcome: Progressing     Problem: DISCHARGE PLANNING  Goal: Discharge to home or other facility with appropriate resources  Description: INTERVENTIONS:  - Identify barriers to discharge w/patient and caregiver  - Arrange for needed discharge resources and transportation as appropriate  - Identify discharge learning needs (meds, wound care, etc.)  - Arrange for interpretive services to assist at discharge as needed  - Refer to Case Management Department for coordinating discharge planning if the patient needs post-hospital services based on physician/advanced practitioner order or complex needs related to functional status, cognitive ability, or social support system  Outcome: Progressing     Problem: Knowledge Deficit  Goal: Patient/family/caregiver demonstrates understanding of disease process, treatment plan, medications, and discharge instructions  Description: Complete learning assessment and assess knowledge base.   Interventions:  - Provide teaching at level of understanding  - Provide teaching via preferred learning methods  Outcome: Progressing

## 2023-09-12 NOTE — PLAN OF CARE
Problem: ANTEPARTUM  Goal: Maintain pregnancy as long as maternal and/or fetal condition is stable  Description: INTERVENTIONS:  - Maternal surveillance  - Fetal surveillance  - Monitor uterine activity  - Medications as ordered  - Bedrest  Outcome: Progressing     Problem: BIRTH - VAGINAL/ SECTION  Goal: Fetal and maternal status remain reassuring during the birth process  Description: INTERVENTIONS:  - Monitor vital signs  - Monitor fetal heart rate  - Monitor uterine activity  - Monitor labor progression (vaginal delivery)  - DVT prophylaxis  - Antibiotic prophylaxis  Outcome: Progressing  Goal: Emotionally satisfying birthing experience for mother/fetus  Description: Interventions:  - Assess, plan, implement and evaluate the nursing care given to the patient in labor  - Advocate the philosophy that each childbirth experience is a unique experience and support the family's chosen level of involvement and control during the labor process   - Actively participate in both the patient's and family's teaching of the birth process  - Consider cultural, Congregation and age-specific factors and plan care for the patient in labor  Outcome: Progressing     Problem: POSTPARTUM  Goal: Experiences normal postpartum course  Description: INTERVENTIONS:  - Monitor maternal vital signs  - Assess uterine involution and lochia  Outcome: Progressing  Goal: Appropriate maternal -  bonding  Description: INTERVENTIONS:  - Identify family support  - Assess for appropriate maternal/infant bonding   -Encourage maternal/infant bonding opportunities  - Referral to  or  as needed  Outcome: Progressing  Goal: Establishment of infant feeding pattern  Description: INTERVENTIONS:  - Assess breast/bottle feeding  - Refer to lactation as needed  Outcome: Progressing  Goal: Incision(s), wounds(s) or drain site(s) healing without S/S of infection  Description: INTERVENTIONS  - Assess and document dressing, incision, wound bed, drain sites and surrounding tissue  - Provide patient and family education  - Perform skin care/dressing changes every  Outcome: Progressing

## 2023-09-12 NOTE — OB LABOR/OXYTOCIN SAFETY PROGRESS
Oxytocin Safety Progress Check Note - Digmary RiveraReyes 21 y.o. female MRN: [id number]    Unit/Bed#: L&D 326-01 Encounter: [id number]    Dose (abigail-units/min) Oxytocin: 0 abigail-units/min (per dr Charli Upton)  Contraction Frequency (minutes): 3-5 (per patient, not tracing well)  Contraction Quality: Mild  Tachysystole: No   Cervical Dilation: 6        Cervical Effacement: 90  Fetal Station: -1  Baseline Rate: 125 bpm (1951 mob sitting up on ball, tracing her HR)  Fetal Heart Rate: 130 BPM  FHR Category: Category I               Vital Signs:   Vitals:    09/11/23 1845   BP: 122/73   Pulse:    Resp:    Temp: 98.1 °F (36.7 °C)       Notes/comments:   Period of fetal deceleartion to 70 bpm, oxytocin was at 8 mu/min and was stopped during the deceleration. FHT recovered to previous baseline. Patient continues to have regular contractions every 3-4 minutes off oxytocin. FHT now category I. Recheck in 1 hr and if continuing to make cervical change off oxytocin can continue expectant management otherwise consider placing IUPC to help titrate oxytocin.           Navin Akbar MD 9/11/2023 9:59 PM

## 2023-09-12 NOTE — L&D DELIVERY NOTE
OBGYN Vaginal Delivery Summary  Digmary RiveraReyes 21 y.o. female MRN: [id number]  Unit/Bed#: L&D 326-01 Encounter: [id number]    Predelivery Diagnosis:  1. Pregnancy at 34w  2.  labor  3. Anemia affecting pregnancy  4. Hepatitis B nonimmune     Postdelivery Diagnosis:  1. Same as above  2. Delivery of      Procedure: spontaneous vaginal delivery    Attending: Dr. Trino Moya    Assistant: Dr. Daljit Eddy    Anesthesia: None    QBL: 51 mL  Admission H.1 g/dL  Admission platelets: 352 thousands/uL    Complications: none apparent    Specimens: cord blood, arterial and venous cord blood gases, placenta to pathology    Findings:   1. Viable male at 0218, with APGARS of 9 and 9 at 1 and 5 minutes respectively. Weight pending at time of dictation. 2. Spontaneous delivery of intact placenta at 0223. central insertion 3 vessel umbilical cord  3. Right labial laceration not requiring repair. 4. Blood gases:  Umbilical Cord Venous Blood Gas:  Results from last 7 days   Lab Units 23  0219   PH COV  7.464   PCO2 COV mm HG 34.2   HCO3 COV mmol/L 24.0   BASE EXC COV mmol/L 0.7*   O2 CT CD VB mL/dL 14.3   O2 HGB, VENOUS CORD % 14.3     Umbilical Cord Arterial Blood Gas:        Disposition:  Patient tolerated the procedure well and was recovering in labor and delivery room. Brief history and labor course:  Angela Nieto is a 21 y.o. [id number] at 34wk4d. She presented to labor and delivery with PPROM. At presentation, she was 4/-1. She was already steroid complete from a previous admission. Pitocin was started for augmentation. She progressed to complete cervical dilation and began pushing. Description of procedure  After pushing for 1 minute, the patient delivered a viable male  at 66 91 28 on 23 precipitously into the patient's bed, weight pending at time of dictation, apgars of 9 (1 min) and 9 (5 min). The fetal vertex delivered spontaneously.  There was no nuchal cord. The remainder of the fetus delivered spontaneously. Upon delivery the infant was placed on the mother's abdomen and delayed cord clamping was performed. The umbilical cord was then doubly clamped and cut. The infant was noted to cry spontaneously and was moving all extremities appropriately. There was no evidence for injury. Awaiting nurse resuscitators evaluated the . Arterial and venous cord blood gases and cord blood were collected for analysis and were promptly sent to the lab. In the immediate post-partum, IV pitocin was administered, and the uterus was noted to contract down well with massage and pitocin. The placenta delivered spontaneously at 0223 and was noted to be intact and had a centrally inserted 3 vessel cord. The placenta was sent to pathology. The vagina, cervix, perineum, and rectum were inspected. A right labial laceration(s) were noted. Repair was not required. At the conclusion of the procedure, all needle, sponge, and instrument counts were noted to be correct. Patient tolerated the procedure well and was allowed to recover in labor and delivery room with family and  before being transferred to the post-partum floor. Dr. Lexii Suh was present and participated in all key portions of the case.     Jaylin Acevedo MD  2023  2:35 AM

## 2023-09-12 NOTE — OB LABOR/OXYTOCIN SAFETY PROGRESS
Oxytocin Safety Progress Check Note - Digmary RiveraReyes 21 y.o. female MRN: [id number]    Unit/Bed#: L&D 326-01 Encounter: [id number]    Dose (abigail-units/min) Oxytocin: 6 abigail-units/min  Contraction Frequency (minutes): 4-5  Contraction Quality: Mild  Tachysystole: No   Cervical Dilation: 7-8        Cervical Effacement: 90  Fetal Station: -1  Baseline Rate: 125 bpm  Fetal Heart Rate:  (after returning from bathroom HR tracing in 70s/80s)  FHR Category: Category I               Vital Signs:   Vitals:    09/12/23 0015   BP: 114/76   Pulse: 70   Resp:    Temp:        Notes/comments:   Patient feeling more uncomfortable. SVE as above. She would like an epidural. Running 1L fluid bolus first. Tracing category 1. Will reassess as needed.      Divya Chowdary MD 9/12/2023 1:10 AM

## 2023-09-12 NOTE — LACTATION NOTE
This note was copied from a baby's chart. CONSULT - LACTATION  Baby Boy (Digmary) RiveraReyes 0 days male MRN: [id number]    [address] [place] [address] Room / Bed: NICU 02/NICU 26 Encounter: [id number]    Maternal Information     MOTHER:  RiveraReyes,Digmary  Maternal Age: 21 y.o.   OB History: # 1 - Date: 10/01/15, Sex: Female, Weight: 3175 g (7 lb), GA: 38w0d, Delivery: Vaginal, Spontaneous, Apgar1: None, Apgar5: None, Living: Living, Birth Comments: FOB1    # 2 - Date: , Sex: None, Weight: None, GA: None, Delivery: Spontaneous , Apgar1: None, Apgar5: None, Living: None, Birth Comments: FOB2    # 3 - Date: 20, Sex: Female, Weight: 2268 g (5 lb), GA: 37w0d, Delivery: Vaginal, Spontaneous, Apgar1: None, Apgar5: None, Living: Living, Birth Comments: Beatrice Pedraza    # 4 - Date: 23, Sex: Male, Weight: 2650 g (5 lb 13.5 oz), GA: 34w4d, Delivery: Vaginal, Spontaneous, Apgar1: 9, Apgar5: 9, Living: Living, Birth Comments: None   Previouse breast reduction surgery? No    Lactation history:   Has patient previously breast fed: How long had patient previously breast fed: 6 months with her 1st child  & over 1 year with her 2nd   Previous breast feeding complications: None     Past Surgical History:   Procedure Laterality Date   • NO PAST SURGERIES          Birth information:  YOB: 2023   Time of birth: 1:20 AM   Sex: male   Delivery type: Vaginal, Spontaneous   Birth Weight: 2650 g (5 lb 13.5 oz)   Percent of Weight Change: 0%     Gestational Age: 27w3d   [unfilled]    Assessment     Breast and nipple assessment: normal assessment     Assessment: 34 week BOY as per NICU    Feeding assessment: as per NICU     LATCH:  Latch:      Audible Swallowing:     Type of Nipple:     Comfort (Breast/Nipple):     Hold (Positioning):     LATCH Score:            Feeding recommendations:  breast feed on demand     Instructions reviewed for hands on pumping for 34 week BOY in NICU. Khang Corado Discussed when to start, frequency, different pumps available versus manual expression. Discussed hygiene of hands and supplies as well as assembly, placement of flanges, size of flanged, preparing the breast and cycles and suction settings on pump. Demonstrated use of hand pump. Discussed labeling of milk, storage, and preparation of stored milk. Also went  over Ready, Set Baby, NICU and discharge breastfeeding booklet including the feeding log. Emphasized 8 or more (12) feedings in a 24 hour period, what to expect for the number of diapers per day of life and the progression of properties of the  stooling pattern. List of reasons to call a lactation consultant. Feeding logs  Feeding cues  Hand expression  Baby's Second day (cluster feeding)  Breastfeeding and Your Lifestyle (Medications, Alcohol, Caffeine, Smoking, Street Drugs, Methadone)  First Two Weeks Survival Guide for Breastfeeding  Breast Changes  Physical Therapy  Storage and Handling of Breast milk  How to Keep Your Breast Pump Kit Clean  The Employed Breastfeeding Mother  Mixed feeding  Bottle feeding like breastfeeding (paced bottle feeding)  astfeeding and your lifestyle, storage and preparation of breast milk, how to keep you breast pump clean, the employed breastfeeding mother and paced bottle feeding handouts. Booklet included Breastfeeding Resources for after discharge including access to the number for the [employer]. Education provided in [employer] # [id number], [employer]  to call for assistance, questions and concerns. No family support at bedside at this time. Extension number for inpatient lactation support provided.             Vilinda Merlin, RN 2023 10:45 AM

## 2023-09-12 NOTE — ED PROVIDER NOTES
History  Chief Complaint   Patient presents with   • Contractions     Pt spontaneous rupture of membranes, 36 weeks preg, denies contractions but reports "I never felt contractions before" via      21year old female,  who thinks she is about 35 weeks gestation with SIUP. Was walking on street 15 minutes PTA when had large gush of clear fluid from vagina. Thinks her water broke. Feels baby moving. No vaginal bleeding. 2 Previous deliveries via vaginal birth. No fevers, no dysuria. Follows with OB through [facility] at [facility]. Yakut speaking patient, offered  services, preferred her neighbor and [place]an speaking staff to translate. Spoke with Dr. John Paul Corado for transfer. Prior to Admission Medications   Prescriptions Last Dose Informant Patient Reported? Taking? Prenatal Vit-Fe Fumarate-FA (Prenatal Plus Vitamin/Mineral) 27-1 MG TABS   No No   Sig: Take 1 tablet by mouth in the morning   ferrous sulfate 324 (65 Fe) mg   No No   Sig: Take 1 tablet (324 mg total) by mouth daily before breakfast      Facility-Administered Medications: None       Past Medical History:   Diagnosis Date   • Asthma     early childhood       Past Surgical History:   Procedure Laterality Date   • NO PAST SURGERIES         Family History   Problem Relation Age of Onset   • Breast cancer Neg Hx    • Colon cancer Neg Hx    • Ovarian cancer Neg Hx    • Cancer Neg Hx      I have reviewed and agree with the history as documented. E-Cigarette/Vaping   • E-Cigarette Use Never User      E-Cigarette/Vaping Substances     Social History     Tobacco Use   • Smoking status: Never   • Smokeless tobacco: Never   Vaping Use   • Vaping Use: Never used   Substance Use Topics   • Alcohol use: Not Currently     Comment: couple times/month, none since pregnant   • Drug use: Never       Review of Systems   Constitutional: Negative. Negative for chills and fever. HENT: Negative.   Negative for rhinorrhea, sore throat, trouble swallowing and voice change. Eyes: Negative. Negative for pain and visual disturbance. Respiratory: Negative. Negative for cough, shortness of breath and wheezing. Cardiovascular: Negative. Negative for chest pain and palpitations. Gastrointestinal: Negative for abdominal pain, diarrhea, nausea and vomiting. Genitourinary: Negative. Negative for dysuria, frequency, pelvic pain, vaginal bleeding and vaginal pain. Musculoskeletal: Negative. Negative for neck pain and neck stiffness. Skin: Negative. Negative for rash. Neurological: Negative. Negative for dizziness, speech difficulty, weakness, light-headedness and numbness. Physical Exam  Physical Exam  Vitals and nursing note reviewed. Exam conducted with a chaperone present. Constitutional:       General: She is not in acute distress. Appearance: She is well-developed. HENT:      Head: Normocephalic and atraumatic. Eyes:      Conjunctiva/sclera: Conjunctivae normal.      Pupils: Pupils are equal, round, and reactive to light. Neck:      Trachea: No tracheal deviation. Cardiovascular:      Rate and Rhythm: Normal rate and regular rhythm. Pulmonary:      Effort: Pulmonary effort is normal. No respiratory distress. Breath sounds: Normal breath sounds. No wheezing or rales. Abdominal:      General: Bowel sounds are normal. There is no distension. Palpations: Abdomen is soft. Tenderness: There is no abdominal tenderness. There is no guarding or rebound. Genitourinary:     Exam position: Lithotomy position. Vagina: No foreign body. No erythema, tenderness, bleeding, lesions or prolapsed vaginal walls. Comments: Obviously gravid abd/uterus, large amounts of fluid in vaginal vault. Poorly visualize cervix on pelvic exam. No presenting fetal parts noted. FHR okay Approximately 125. Musculoskeletal:         General: No tenderness or deformity. Normal range of motion.       Cervical back: Normal range of motion and neck supple. Skin:     General: Skin is warm and dry. Capillary Refill: Capillary refill takes less than 2 seconds. Findings: No rash. Neurological:      Mental Status: She is alert and oriented to person, place, and time.    Psychiatric:         Behavior: Behavior normal.         Vital Signs  ED Triage Vitals [09/11/23 1303]   Temperature Pulse Respirations Blood Pressure SpO2   98 °F (36.7 °C) 102 20 154/90 98 %      Temp Source Heart Rate Source Patient Position - Orthostatic VS BP Location FiO2 (%)   Oral -- Sitting Left arm --      Pain Score       No Pain           Vitals:    09/11/23 1303   BP: 154/90   Pulse: 102   Patient Position - Orthostatic VS: Sitting         Visual Acuity      ED Medications  Medications - No data to display    Diagnostic Studies  Results Reviewed     Procedure Component Value Units Date/Time    Comprehensive metabolic panel [[id number]]  (Abnormal) Collected: 09/11/23 1321    Lab Status: Final result Specimen: Blood from Arm, Right Updated: 09/11/23 1346     Sodium 135 mmol/L      Potassium 3.7 mmol/L      Chloride 103 mmol/L      CO2 21 mmol/L      ANION GAP 11 mmol/L      BUN 7 mg/dL      Creatinine 0.64 mg/dL      Glucose 143 mg/dL      Calcium 9.2 mg/dL      AST 19 U/L      ALT 22 U/L      Alkaline Phosphatase 150 U/L      Total Protein 6.7 g/dL      Albumin 3.6 g/dL      Total Bilirubin 0.48 mg/dL      eGFR 126 ml/min/1.73sq m     Narrative:      [facility] guidelines for Chronic Kidney Disease (CKD):   •  Stage 1 with normal or high GFR (GFR > 90 mL/min/1.73 square meters)  •  Stage 2 Mild CKD (GFR = 60-89 mL/min/1.73 square meters)  •  Stage 3A Moderate CKD (GFR = 45-59 mL/min/1.73 square meters)  •  Stage 3B Moderate CKD (GFR = 30-44 mL/min/1.73 square meters)  •  Stage 4 Severe CKD (GFR = 15-29 mL/min/1.73 square meters)  •  Stage 5 End Stage CKD (GFR <15 mL/min/1.73 square meters)  Note: GFR calculation is accurate only with a steady state creatinine    CBC and differential [[id number]]  (Abnormal) Collected: 09/11/23 1321    Lab Status: Final result Specimen: Blood from Arm, Right Updated: 09/11/23 1328     WBC 7.62 Thousand/uL      RBC 3.93 Million/uL      Hemoglobin 11.1 g/dL      Hematocrit 34.6 %      MCV 88 fL      MCH 28.2 pg      MCHC 32.1 g/dL      RDW 13.0 %      MPV 9.4 fL      Platelets 235 Thousands/uL      nRBC 0 /100 WBCs      Neutrophils Relative 73 %      Immat GRANS % 1 %      Lymphocytes Relative 20 %      Monocytes Relative 4 %      Eosinophils Relative 2 %      Basophils Relative 0 %      Neutrophils Absolute 5.58 Thousands/µL      Immature Grans Absolute 0.05 Thousand/uL      Lymphocytes Absolute 1.51 Thousands/µL      Monocytes Absolute 0.32 Thousand/µL      Eosinophils Absolute 0.14 Thousand/µL      Basophils Absolute 0.02 Thousands/µL                  No orders to display              Procedures  Procedures         ED Course                               SBIRT 20yo+    Flowsheet Row Most Recent Value   Initial Alcohol Screen: US AUDIT-C     1. How often do you have a drink containing alcohol? 0 Filed at: 09/11/2023 1306   2. How many drinks containing alcohol do you have on a typical day you are drinking? 0 Filed at: 09/11/2023 1306   3a. Male UNDER 65: How often do you have five or more drinks on one occasion? 0 Filed at: 09/11/2023 1306   3b. FEMALE Any Age, or MALE 65+: How often do you have 4 or more drinks on one occassion? 0 Filed at: 09/11/2023 1306   Audit-C Score 0 Filed at: 09/11/2023 1306   HAZEL: How many times in the past year have you. .. Used an illegal drug or used a prescription medication for non-medical reasons? Never Filed at: 09/11/2023 1306                    Medical Decision Making  21 year odl female, presenting for gush of fluid in setting of third trimester pregnancy. FHR okay, +pooling of fluid in vaginal vault.  Cervix could not be visualized easily, Exam consistent with premature  rupture of membranes. Afebrile. Vaginal exam done with sterile gloves, speculum and lubricant. No evidence of pain on abd exam, will transfer for higher level of care. Dr. Pamela Corbin accepting. Patient transferred in stable condition. Amount and/or Complexity of Data Reviewed  Labs: ordered. Disposition  Final diagnoses:   Premature labor with rupture of membranes     Time reflects when diagnosis was documented in both MDM as applicable and the Disposition within this note     Time User Action Codes Description Comment    2023  1:11 PM Néstor Jiménez Add [O62.9] Labor abnormal     2023  1:12 PM Néstor Jiménez Add [O42.90] Premature labor with rupture of membranes     2023  1:12 PM Néstor Jiménez Modify [O42.90] Premature labor with rupture of membranes     2023  1:12 PM Néstor Jiménez Remove [O62.9] Labor abnormal       ED Disposition     ED Disposition   Transfer to Another Facility-In Network    Condition   --    Date/Time   Mon Sep 11, 2023 [address] should be transferred out to [facility] L&D.            MD Documentation    Reji Glover Most Recent Value   Patient Condition The patient has been stabilized such that within reasonable medical probability, no material deterioration of the patient condition or the condition of the unborn child(mony) is likely to result from the transfer   Reason for Transfer Level of Care needed not available at this facility  [OB/Gyn]   Benefits of Transfer Specialized equipment and/or services available at the receiving facility (Include comment)________________________   Risks of Transfer Potential for delay in receiving treatment, Potential deterioration of medical condition, Loss of IV, Increased discomfort during transfer, Possible worsening of condition or death during transfer   Accepting Physician Dr. Golda Lanes Name, [address], [address] L&D Sending MD Dr. Gallagher Certain   Provider Certification General risk, such as traffic hazards, adverse weather conditions, rough terrain or turbulence, possible failure of equipment (including vehicle or aircraft), or consequences of actions of persons outside the control of the transport personnel, Unanticipated needs of medical equipment and personnel during transport, Risk of worsening condition, The possibility of a transport vehicle being unavailable      RN Documentation    [address] Name, [address] L&D   Level of Care Advanced life support      Follow-up Information    None         Discharge Medication List as of 9/11/2023  1:41 PM      CONTINUE these medications which have NOT CHANGED    Details   ferrous sulfate 324 (65 Fe) mg Take 1 tablet (324 mg total) by mouth daily before breakfast, Starting Tue 8/22/2023, Normal      Prenatal Vit-Fe Fumarate-FA (Prenatal Plus Vitamin/Mineral) 27-1 MG TABS Take 1 tablet by mouth in the morning, Starting Mon 8/7/2023, Normal             No discharge procedures on file.     PDMP Review     None          ED Provider  Electronically Signed by           Eduardo Palma DO  09/11/23 3929

## 2023-09-12 NOTE — OB LABOR/OXYTOCIN SAFETY PROGRESS
Oxytocin Safety Progress Check Note - Digmary RiveraReyes 21 y.o. female MRN: [id number]    Unit/Bed#: L&D 326-01 Encounter: [id number]    Dose (abigail-units/min) Oxytocin: 2 abigail-units/min (per dr Pia Wright)  Contraction Frequency (minutes): 3-5 (per patient, not tracing well)  Contraction Quality: Mild  Tachysystole: No   Cervical Dilation: 6        Cervical Effacement: 90  Fetal Station: -1  Baseline Rate: 125 bpm  Fetal Heart Rate:  (after returning from bathroom HR tracing in 70s/80s)  FHR Category: Category I               Vital Signs:   Vitals:    09/11/23 1845   BP: 122/73   Pulse:    Resp:    Temp: 98.1 °F (36.7 °C)       Notes/comments:   Patient comfortable overall. FHT has not shown any additional decelerations. SVE is unchanged, will restart pitocin. Tracing category 1. IUPC placed.   Dr. Tomasz Esparza aware     Kristina Mendoza MD 9/11/2023 10:56 PM

## 2023-09-13 PROCEDURE — 99024 POSTOP FOLLOW-UP VISIT: CPT | Performed by: OBSTETRICS & GYNECOLOGY

## 2023-09-13 PROCEDURE — 11981 INSERTION DRUG DLVR IMPLANT: CPT | Performed by: OBSTETRICS & GYNECOLOGY

## 2023-09-13 RX ORDER — IBUPROFEN 600 MG/1
600 TABLET ORAL EVERY 6 HOURS SCHEDULED
Status: DISCONTINUED | OUTPATIENT
Start: 2023-09-13 | End: 2023-09-14 | Stop reason: HOSPADM

## 2023-09-13 RX ORDER — LIDOCAINE HYDROCHLORIDE 10 MG/ML
2 INJECTION, SOLUTION EPIDURAL; INFILTRATION; INTRACAUDAL; PERINEURAL ONCE
Status: COMPLETED | OUTPATIENT
Start: 2023-09-13 | End: 2023-09-13

## 2023-09-13 RX ADMIN — IBUPROFEN 600 MG: 600 TABLET ORAL at 20:45

## 2023-09-13 RX ADMIN — LIDOCAINE HYDROCHLORIDE 2 ML: 10 INJECTION, SOLUTION EPIDURAL; INFILTRATION; INTRACAUDAL; PERINEURAL at 15:24

## 2023-09-13 RX ADMIN — DOCUSATE SODIUM 100 MG: 100 CAPSULE, LIQUID FILLED ORAL at 09:41

## 2023-09-13 RX ADMIN — IBUPROFEN 600 MG: 600 TABLET ORAL at 03:19

## 2023-09-13 RX ADMIN — IBUPROFEN 600 MG: 600 TABLET ORAL at 12:53

## 2023-09-13 RX ADMIN — DOCUSATE SODIUM 100 MG: 100 CAPSULE, LIQUID FILLED ORAL at 20:45

## 2023-09-13 RX ADMIN — ETONOGESTREL 68 MG: 68 IMPLANT SUBCUTANEOUS at 15:29

## 2023-09-13 NOTE — CASE MANAGEMENT
Case Management Progress Note    Patient name Darrius Moreira  Location L&D 316/L&D 056-64 MRN [id number]  : 2000 Date 2023       LOS (days): 2  Geometric Mean LOS (GMLOS) (days):   Days to GMLOS:        OBJECTIVE:        Current admission status: Inpatient  Preferred Pharmacy:   [address] [place] [address], [address]  Phone: [phone] Fax: [phone]    Primary Care Provider: No primary care provider on file. Primary Insurance: ADDISON ALONZO PENDING  Secondary Insurance:     PROGRESS NOTE:      Consult(s): NICU assessment/lack of PNC    CM met w/MOB who provided the following information:      · Baby's name/gender: Baby Boy RiveraReyes  · Mother of baby: Darrius Moreira  · Father of baby//SO: MOB did not want to name FOB. Reported FOB lives in [place]. · Other Legal Guardian(s) for baby: N/A  · Alternate emergency contact: MIKE has her mother listed as an EC. Declined any other ECs. · Other children: MIKE has 2 daughters, 3yo and 8yo that reside in [place] with her grandmother. · Lives with: MIKE is residing in [place] with her mother and brother. MIKE plans on continuing to live with her mother. MOB reported once she starts working and has money, she intends to visit the girls in [facility].  · Support System: Family members  · Baby Supplies:  MIKE stated she has a car seat for baby; however, still needs to obtain the diapers/wipes. Reported her mother is working and she will have the financial means to obtain. CM provided referral form for [facility]. · Bottle or Breast Feeding: Breast feeding  · Breast Pump if breast feeding: MOB requesting [employer]. MOB does not have insurance. CM requested pump through [facility]. · Government Assistance Programs/WIC/EBT/SSI:  MIKE denies  · Work/School:  MOB not currently working but intends to. · Transportation: MIKE stated her mother provides transportation.   · Prenatal care: MIKE recently moved here [place] [facility] [place]. She had a few appointments at [facility] before delivering. · Pediatrician:  [facility]  · Mental Health Hx or Treatment: MIKE denies   · Substance Abuse: MIKE denies   · Insurance for baby: PATHS referral was sent. CM utilized [employer] for interpretation. MIKE denies any other CM needs at this time. Encouraged family to contact CM as needed.

## 2023-09-13 NOTE — PLAN OF CARE

## 2023-09-13 NOTE — PROGRESS NOTES
Progress Note - OB/GYN  Digmary RiveraReyes 21 y.o. female MRN: [id number]  Unit/Bed#: L&D 316-01 Encounter: [id number]     [id number] used    Assessment and Plan     Marlyn Fulton is a patient of: [address] [place] [address]. She is PPD# 1 s/p  spontaneous vaginal delivery  Recovering well and is stable        (spontaneous vaginal delivery)  Assessment & Plan  Lochia WNL   Recovering well   Appropriate bowel and bladder function   Pain well controlled   Tolerating diet   Breastfeeding and bottle feeding   Ambulating without issues   No lower extremity tenderness  GBS neg   Rh pos   Baby in NICU    Not immune to hepatitis B virus  Assessment & Plan  Offer vaccination after delivery     Maternal anemia  Assessment & Plan  Hgb 11.1 on admission       Disposition    - Anticipate discharge home on PPD# 1-2      Subjective/Objective     Chief Complaint: Postpartum State     Subjective:    Marlyn Fulton is PPD#1 s/p  spontaneous vaginal delivery. She has no current complaints. Pain is well controlled. Patient is currently voiding. She is ambulating. Patient is currently passing flatus and has had no bowel movement. She is tolerating PO, and denies nausea or vomitting. Patient denies fever, chills, chest pain, shortness of breath, or calf tenderness. Lochia is normal. She is  Breastfeeding and Bottle feeding. She is recovering well and is stable.        Vitals:   /65 (BP Location: Left arm)   Pulse 71   Temp 97.9 °F (36.6 °C) (Temporal)   Resp 18   Ht 5' 7" (1.702 m)   Wt 99.8 kg (220 lb)   LMP 2023 (Exact Date)   SpO2 99%   Breastfeeding Yes   BMI 34.46 kg/m²     No intake or output data in the 24 hours ending 23 0508    Invasive Devices     Peripheral Intravenous Line  Duration           Peripheral IV 23 Right Antecubital 1 day          Intrauterine Pressure Catheter  Duration           Intrauterine Pressure Catheter 23 9339 1 day Physical Exam:   GEN: Digmary RiveraReyes appears well, alert and oriented x 3, pleasant and cooperative   CARDIO: RRR, no murmurs or rubs  RESP:  CTAB, no wheezes or rales  ABDOMEN: soft, no tenderness, no distention, fundus @ U-3  EXTREMITIES: non tender, no erythema, b/l Kwan's sign negative      Labs:     Hemoglobin   Date Value Ref Range Status   09/11/2023 11.1 (L) 11.5 - 15.4 g/dL Final   08/22/2023 11.3 (L) 11.5 - 15.4 g/dL Final     WBC   Date Value Ref Range Status   09/11/2023 7.62 4.31 - 10.16 Thousand/uL Final   08/22/2023 10.74 (H) 4.31 - 10.16 Thousand/uL Final     Platelets   Date Value Ref Range Status   09/11/2023 278 149 - 390 Thousands/uL Final   08/22/2023 288 149 - 390 Thousands/uL Final     Creatinine   Date Value Ref Range Status   09/11/2023 0.64 0.60 - 1.30 mg/dL Final     Comment:     Standardized to IDMS reference method     AST   Date Value Ref Range Status   09/11/2023 19 13 - 39 U/L Final     ALT   Date Value Ref Range Status   09/11/2023 22 7 - 52 U/L Final     Comment:     Specimen collection should occur prior to Sulfasalazine administration due to the potential for falsely depressed results.            Flor Patrick MD  9/13/2023  5:08 AM

## 2023-09-13 NOTE — PLAN OF CARE
Problem: POSTPARTUM  Goal: Experiences normal postpartum course  Description: INTERVENTIONS:  - Monitor maternal vital signs  - Assess uterine involution and lochia  Outcome: Progressing  Goal: Appropriate maternal -  bonding  Description: INTERVENTIONS:  - Identify family support  - Assess for appropriate maternal/infant bonding   -Encourage maternal/infant bonding opportunities  - Referral to  or  as needed  Outcome: Progressing  Goal: Establishment of infant feeding pattern  Description: INTERVENTIONS:  - Assess breast/bottle feeding  - Refer to lactation as needed  Outcome: Progressing  Goal: Incision(s), wounds(s) or drain site(s) healing without S/S of infection  Description: INTERVENTIONS  - Assess and document dressing, incision, wound bed, drain sites and surrounding tissue  - Provide patient and family education  - Perform skin care/dressing changes every   Outcome: Progressing     Problem: PAIN - ADULT  Goal: Verbalizes/displays adequate comfort level or baseline comfort level  Description: Interventions:  - Encourage patient to monitor pain and request assistance  - Assess pain using appropriate pain scale  - Administer analgesics based on type and severity of pain and evaluate response  - Implement non-pharmacological measures as appropriate and evaluate response  - Consider cultural and social influences on pain and pain management  - Notify physician/advanced practitioner if interventions unsuccessful or patient reports new pain  Outcome: Progressing     Problem: INFECTION - ADULT  Goal: Absence or prevention of progression during hospitalization  Description: INTERVENTIONS:  - Assess and monitor for signs and symptoms of infection  - Monitor lab/diagnostic results  - Monitor all insertion sites, i.e. indwelling lines, tubes, and drains  - Monitor endotracheal if appropriate and nasal secretions for changes in amount and color  - [place] appropriate cooling/warming therapies per order  - Administer medications as ordered  - Instruct and encourage patient and family to use good hand hygiene technique  - Identify and instruct in appropriate isolation precautions for identified infection/condition  Outcome: Progressing  Goal: Absence of fever/infection during neutropenic period  Description: INTERVENTIONS:  - Monitor WBC    Outcome: Progressing     Problem: SAFETY ADULT  Goal: Patient will remain free of falls  Description: INTERVENTIONS:  - Educate patient/family on patient safety including physical limitations  - Instruct patient to call for assistance with activity   - Consult OT/PT to assist with strengthening/mobility   - Keep Call bell within reach  - Keep bed low and locked with side rails adjusted as appropriate  - Keep care items and personal belongings within reach  - Initiate and maintain comfort rounds  - Make Fall Risk Sign visible to staff  - Offer Toileting every  Hours, in advance of need  - Initiate/Maintain alarm  - Obtain necessary fall risk management equipment:   - Apply yellow socks and bracelet for high fall risk patients  - Consider moving patient to room near nurses station  Outcome: Progressing  Goal: Maintain or return to baseline ADL function  Description: INTERVENTIONS:  -  Assess patient's ability to carry out ADLs; assess patient's baseline for ADL function and identify physical deficits which impact ability to perform ADLs (bathing, care of mouth/teeth, toileting, grooming, dressing, etc.)  - Assess/evaluate cause of self-care deficits   - Assess range of motion  - Assess patient's mobility; develop plan if impaired  - Assess patient's need for assistive devices and provide as appropriate  - Encourage maximum independence but intervene and supervise when necessary  - Involve family in performance of ADLs  - Assess for home care needs following discharge   - Consider OT consult to assist with ADL evaluation and planning for discharge  - Provide patient education as appropriate  Outcome: Progressing  Goal: Maintains/Returns to pre admission functional level  Description: INTERVENTIONS:  - Perform BMAT or MOVE assessment daily.   - Set and communicate daily mobility goal to care team and patient/family/caregiver. - Collaborate with rehabilitation services on mobility goals if consulted  - Perform Range of Motion  times a day. - Reposition patient every  hours. - Dangle patient  times a day  - Stand patient  times a day  - Ambulate patient  times a day  - Out of bed to chair  times a day   - Out of bed for meals times a day  - Out of bed for toileting  - Record patient progress and toleration of activity level   Outcome: Progressing     Problem: DISCHARGE PLANNING  Goal: Discharge to home or other facility with appropriate resources  Description: INTERVENTIONS:  - Identify barriers to discharge w/patient and caregiver  - Arrange for needed discharge resources and transportation as appropriate  - Identify discharge learning needs (meds, wound care, etc.)  - Arrange for interpretive services to assist at discharge as needed  - Refer to Case Management Department for coordinating discharge planning if the patient needs post-hospital services based on physician/advanced practitioner order or complex needs related to functional status, cognitive ability, or social support system  Outcome: Progressing     Problem: Knowledge Deficit  Goal: Patient/family/caregiver demonstrates understanding of disease process, treatment plan, medications, and discharge instructions  Description: Complete learning assessment and assess knowledge base.   Interventions:  - Provide teaching at level of understanding  - Provide teaching via preferred learning methods  Outcome: Progressing

## 2023-09-13 NOTE — PROGRESS NOTES
Procedure: Nexplanon insertion    The patient is right handed. The left arm was chosen. Consent was explained and signed. The patient was not pregnant; she just delivered a baby. The patient was positioned supine with her arm externally rotated and flexed at the elbow with her hand behind her head. The insertion site was chosen 8-10 cm medial to the medial epicondyle, and 3-5 cm posterior the sulcus between the bicep and tricep in order to avoid the vascular bundle and ulnar nerve. The skin was cleansed with alcohol. 1% lidocaine was infiltrated at the insertion site. The device was removed from its package. The implant was visible within the insertion device. The insertion was performed according to standard procedure: The tip of the  was passed through the skin, the skin was tented up, and the device was advanced until its full length was beneath the skin. The trigger was activated at and the sheath withdrawn. The implant was successfully deployed. There was a scant bleeding from the skin edge which was controlled with pressure. The implant was palpable by me and the patient. There was a small amount of ecchymosis overlying the middle of the implant. It was not expanding. A Band-Aid was placed over the insertion site. Pressure bandage was placed and patietn was instructed to leave it in place for 4 hours. She may remove it sooner if she looses sensation in her hand. The patient tolerated the procedure well.     Lot number: [id number]  Expiration: May 29 2025

## 2023-09-13 NOTE — LACTATION NOTE
09/13/23 1345   Lactation Consultation   Reason for Consult 5 min;10 minute;20 m   Lactation Consultant Total Time 35   Risk Factors LPI;NICU infant;Language barrier  ( [id number] utilized)   Breasts/Nipples   Intervention Breast pump   Breastfeeding Status Yes   Reasons for not Breastfeeding Infant medical condition   Patient Follow-Up   Lactation Consult Status 2   Follow-Up Type Inpatient;Call as needed   Other OB Lactation Documentation    Additional Problem Noted Digmary had not pumped at all today. Breast milk at bedside table was from yesterday. Offered review of breast milk storage guidelines. Encouraged hands on pumping. Encouraged bringing breast pump with her to NICU if staying for extended periods of time so frequent stimulation can be utilized. Encouraged parents to call for assistance, questions, and concerns about breastfeeding. Extension provided.

## 2023-09-13 NOTE — PLAN OF CARE
Problem: POSTPARTUM  Goal: Experiences normal postpartum course  Description: INTERVENTIONS:  - Monitor maternal vital signs  - Assess uterine involution and lochia  Outcome: Progressing  Goal: Appropriate maternal -  bonding  Description: INTERVENTIONS:  - Identify family support  - Assess for appropriate maternal/infant bonding   -Encourage maternal/infant bonding opportunities  - Referral to  or  as needed  Outcome: Progressing  Goal: Establishment of infant feeding pattern  Description: INTERVENTIONS:  - Assess breast/bottle feeding  - Refer to lactation as needed  Outcome: Progressing  Goal: Incision(s), wounds(s) or drain site(s) healing without S/S of infection  Description: INTERVENTIONS  - Assess and document dressing, incision, wound bed, drain sites and surrounding tissue  - Provide patient and family education  - Perform skin care/dressing changes every   Outcome: Progressing     Problem: PAIN - ADULT  Goal: Verbalizes/displays adequate comfort level or baseline comfort level  Description: Interventions:  - Encourage patient to monitor pain and request assistance  - Assess pain using appropriate pain scale  - Administer analgesics based on type and severity of pain and evaluate response  - Implement non-pharmacological measures as appropriate and evaluate response  - Consider cultural and social influences on pain and pain management  - Notify physician/advanced practitioner if interventions unsuccessful or patient reports new pain  Outcome: Progressing     Problem: INFECTION - ADULT  Goal: Absence or prevention of progression during hospitalization  Description: INTERVENTIONS:  - Assess and monitor for signs and symptoms of infection  - Monitor lab/diagnostic results  - Monitor all insertion sites, i.e. indwelling lines, tubes, and drains  - Monitor endotracheal if appropriate and nasal secretions for changes in amount and color  - [place] appropriate cooling/warming therapies per order  - Administer medications as ordered  - Instruct and encourage patient and family to use good hand hygiene technique  - Identify and instruct in appropriate isolation precautions for identified infection/condition  Outcome: Progressing  Goal: Absence of fever/infection during neutropenic period  Description: INTERVENTIONS:  - Monitor WBC    Outcome: Progressing     Problem: SAFETY ADULT  Goal: Patient will remain free of falls  Description: INTERVENTIONS:  - Educate patient/family on patient safety including physical limitations  - Instruct patient to call for assistance with activity   - Consult OT/PT to assist with strengthening/mobility   - Keep Call bell within reach  - Keep bed low and locked with side rails adjusted as appropriate  - Keep care items and personal belongings within reach  - Initiate and maintain comfort rounds  - Make Fall Risk Sign visible to staff  - Offer Toileting every  Hours, in advance of need  - Initiate/Maintain alarm  - Obtain necessary fall risk management equipment:   - Apply yellow socks and bracelet for high fall risk patients  - Consider moving patient to room near nurses station  Outcome: Progressing  Goal: Maintain or return to baseline ADL function  Description: INTERVENTIONS:  -  Assess patient's ability to carry out ADLs; assess patient's baseline for ADL function and identify physical deficits which impact ability to perform ADLs (bathing, care of mouth/teeth, toileting, grooming, dressing, etc.)  - Assess/evaluate cause of self-care deficits   - Assess range of motion  - Assess patient's mobility; develop plan if impaired  - Assess patient's need for assistive devices and provide as appropriate  - Encourage maximum independence but intervene and supervise when necessary  - Involve family in performance of ADLs  - Assess for home care needs following discharge   - Consider OT consult to assist with ADL evaluation and planning for discharge  - Provide patient education as appropriate  Outcome: Progressing  Goal: Maintains/Returns to pre admission functional level  Description: INTERVENTIONS:  - Perform BMAT or MOVE assessment daily.   - Set and communicate daily mobility goal to care team and patient/family/caregiver. - Collaborate with rehabilitation services on mobility goals if consulted  - Perform Range of Motion  times a day. - Reposition patient every  hours. - Dangle patient  times a day  - Stand patient  times a day  - Ambulate patient  times a day  - Out of bed to chair  times a day   - Out of bed for meals times a day  - Out of bed for toileting  - Record patient progress and toleration of activity level   Outcome: Progressing     Problem: DISCHARGE PLANNING  Goal: Discharge to home or other facility with appropriate resources  Description: INTERVENTIONS:  - Identify barriers to discharge w/patient and caregiver  - Arrange for needed discharge resources and transportation as appropriate  - Identify discharge learning needs (meds, wound care, etc.)  - Arrange for interpretive services to assist at discharge as needed  - Refer to Case Management Department for coordinating discharge planning if the patient needs post-hospital services based on physician/advanced practitioner order or complex needs related to functional status, cognitive ability, or social support system  Outcome: Progressing     Problem: Knowledge Deficit  Goal: Patient/family/caregiver demonstrates understanding of disease process, treatment plan, medications, and discharge instructions  Description: Complete learning assessment and assess knowledge base.   Interventions:  - Provide teaching at level of understanding  - Provide teaching via preferred learning methods  Outcome: Progressing

## 2023-09-14 VITALS
DIASTOLIC BLOOD PRESSURE: 72 MMHG | BODY MASS INDEX: 34.53 KG/M2 | HEART RATE: 68 BPM | HEIGHT: 67 IN | SYSTOLIC BLOOD PRESSURE: 128 MMHG | RESPIRATION RATE: 18 BRPM | TEMPERATURE: 98.3 F | OXYGEN SATURATION: 100 % | WEIGHT: 220 LBS

## 2023-09-14 PROBLEM — O60.00 PRETERM LABOR: Status: RESOLVED | Noted: 2023-08-22 | Resolved: 2023-09-14

## 2023-09-14 PROCEDURE — 88307 TISSUE EXAM BY PATHOLOGIST: CPT | Performed by: STUDENT IN AN ORGANIZED HEALTH CARE EDUCATION/TRAINING PROGRAM

## 2023-09-14 PROCEDURE — 90746 HEPB VACCINE 3 DOSE ADULT IM: CPT

## 2023-09-14 PROCEDURE — 99024 POSTOP FOLLOW-UP VISIT: CPT | Performed by: OBSTETRICS & GYNECOLOGY

## 2023-09-14 RX ORDER — ACETAMINOPHEN 325 MG/1
650 TABLET ORAL EVERY 6 HOURS PRN
Refills: 0
Start: 2023-09-14

## 2023-09-14 RX ORDER — SENNOSIDES 8.6 MG
1 TABLET ORAL
Status: DISCONTINUED | OUTPATIENT
Start: 2023-09-14 | End: 2023-09-14 | Stop reason: HOSPADM

## 2023-09-14 RX ORDER — DOCUSATE SODIUM 100 MG/1
100 CAPSULE, LIQUID FILLED ORAL 2 TIMES DAILY PRN
Qty: 10 CAPSULE | Refills: 0 | Status: SHIPPED | OUTPATIENT
Start: 2023-09-14 | End: 2023-09-19

## 2023-09-14 RX ORDER — POLYETHYLENE GLYCOL 3350 17 G/17G
17 POWDER, FOR SOLUTION ORAL DAILY
Qty: 85 G | Refills: 0 | Status: CANCELLED | OUTPATIENT
Start: 2023-09-14 | End: 2023-09-19

## 2023-09-14 RX ORDER — POLYETHYLENE GLYCOL 3350 17 G/17G
17 POWDER, FOR SOLUTION ORAL DAILY PRN
Status: DISCONTINUED | OUTPATIENT
Start: 2023-09-14 | End: 2023-09-14 | Stop reason: HOSPADM

## 2023-09-14 RX ORDER — SENNOSIDES 8.6 MG
8.6 TABLET ORAL
Qty: 5 TABLET | Refills: 0 | Status: CANCELLED | OUTPATIENT
Start: 2023-09-14 | End: 2023-09-19

## 2023-09-14 RX ORDER — IBUPROFEN 600 MG/1
600 TABLET ORAL EVERY 6 HOURS SCHEDULED
Qty: 30 TABLET | Refills: 0
Start: 2023-09-14

## 2023-09-14 RX ADMIN — HEPATITIS B VACCINE (RECOMBINANT) 20 MCG: 20 INJECTION, SUSPENSION INTRAMUSCULAR at 18:44

## 2023-09-14 RX ADMIN — IBUPROFEN 600 MG: 600 TABLET ORAL at 06:47

## 2023-09-14 RX ADMIN — POLYETHYLENE GLYCOL 3350 17 G: 17 POWDER, FOR SOLUTION ORAL at 17:20

## 2023-09-14 RX ADMIN — ACETAMINOPHEN 325MG 650 MG: 325 TABLET ORAL at 17:20

## 2023-09-14 RX ADMIN — DOCUSATE SODIUM 100 MG: 100 CAPSULE, LIQUID FILLED ORAL at 09:47

## 2023-09-14 RX ADMIN — IBUPROFEN 600 MG: 600 TABLET ORAL at 12:12

## 2023-09-14 RX ADMIN — WITCH HAZEL 1 PAD: 500 SOLUTION RECTAL; TOPICAL at 12:58

## 2023-09-14 RX ADMIN — IBUPROFEN 600 MG: 600 TABLET ORAL at 17:20

## 2023-09-14 RX ADMIN — BENZOCAINE AND LEVOMENTHOL 1 APPLICATION: 200; 5 SPRAY TOPICAL at 12:58

## 2023-09-14 RX ADMIN — DOCUSATE SODIUM 100 MG: 100 CAPSULE, LIQUID FILLED ORAL at 17:20

## 2023-09-14 RX ADMIN — ACETAMINOPHEN 325MG 650 MG: 325 TABLET ORAL at 10:01

## 2023-09-14 NOTE — PLAN OF CARE
Problem: POSTPARTUM  Goal: Experiences normal postpartum course  Description: INTERVENTIONS:  - Monitor maternal vital signs  - Assess uterine involution and lochia  Outcome: Progressing  Goal: Appropriate maternal -  bonding  Description: INTERVENTIONS:  - Identify family support  - Assess for appropriate maternal/infant bonding   -Encourage maternal/infant bonding opportunities  - Referral to  or  as needed  Outcome: Progressing  Goal: Establishment of infant feeding pattern  Description: INTERVENTIONS:  - Assess breast/bottle feeding  - Refer to lactation as needed  Outcome: Progressing  Goal: Incision(s), wounds(s) or drain site(s) healing without S/S of infection  Description: INTERVENTIONS  - Assess and document dressing, incision, wound bed, drain sites and surrounding tissue  - Provide patient and family education  - Perform skin care/dressing changes every   Outcome: Progressing     Problem: PAIN - ADULT  Goal: Verbalizes/displays adequate comfort level or baseline comfort level  Description: Interventions:  - Encourage patient to monitor pain and request assistance  - Assess pain using appropriate pain scale  - Administer analgesics based on type and severity of pain and evaluate response  - Implement non-pharmacological measures as appropriate and evaluate response  - Consider cultural and social influences on pain and pain management  - Notify physician/advanced practitioner if interventions unsuccessful or patient reports new pain  Outcome: Progressing     Problem: INFECTION - ADULT  Goal: Absence or prevention of progression during hospitalization  Description: INTERVENTIONS:  - Assess and monitor for signs and symptoms of infection  - Monitor lab/diagnostic results  - Monitor all insertion sites, i.e. indwelling lines, tubes, and drains  - Monitor endotracheal if appropriate and nasal secretions for changes in amount and color  - [place] appropriate cooling/warming therapies per order  - Administer medications as ordered  - Instruct and encourage patient and family to use good hand hygiene technique  - Identify and instruct in appropriate isolation precautions for identified infection/condition  Outcome: Progressing  Goal: Absence of fever/infection during neutropenic period  Description: INTERVENTIONS:  - Monitor WBC    Outcome: Progressing     Problem: SAFETY ADULT  Goal: Patient will remain free of falls  Description: INTERVENTIONS:  - Educate patient/family on patient safety including physical limitations  - Instruct patient to call for assistance with activity   - Consult OT/PT to assist with strengthening/mobility   - Keep Call bell within reach  - Keep bed low and locked with side rails adjusted as appropriate  - Keep care items and personal belongings within reach  - Initiate and maintain comfort rounds  - Make Fall Risk Sign visible to staff  - Offer Toileting every  Hours, in advance of need  - Initiate/Maintain alarm  - Obtain necessary fall risk management equipment:   - Apply yellow socks and bracelet for high fall risk patients  - Consider moving patient to room near nurses station  Outcome: Progressing  Goal: Maintain or return to baseline ADL function  Description: INTERVENTIONS:  -  Assess patient's ability to carry out ADLs; assess patient's baseline for ADL function and identify physical deficits which impact ability to perform ADLs (bathing, care of mouth/teeth, toileting, grooming, dressing, etc.)  - Assess/evaluate cause of self-care deficits   - Assess range of motion  - Assess patient's mobility; develop plan if impaired  - Assess patient's need for assistive devices and provide as appropriate  - Encourage maximum independence but intervene and supervise when necessary  - Involve family in performance of ADLs  - Assess for home care needs following discharge   - Consider OT consult to assist with ADL evaluation and planning for discharge  - Provide patient education as appropriate  Outcome: Progressing  Goal: Maintains/Returns to pre admission functional level  Description: INTERVENTIONS:  - Perform BMAT or MOVE assessment daily.   - Set and communicate daily mobility goal to care team and patient/family/caregiver. - Collaborate with rehabilitation services on mobility goals if consulted  - Perform Range of Motion  times a day. - Reposition patient every  hours. - Dangle patient  times a day  - Stand patient  times a day  - Ambulate patient  times a day  - Out of bed to chair  times a day   - Out of bed for meals times a day  - Out of bed for toileting  - Record patient progress and toleration of activity level   Outcome: Progressing     Problem: DISCHARGE PLANNING  Goal: Discharge to home or other facility with appropriate resources  Description: INTERVENTIONS:  - Identify barriers to discharge w/patient and caregiver  - Arrange for needed discharge resources and transportation as appropriate  - Identify discharge learning needs (meds, wound care, etc.)  - Arrange for interpretive services to assist at discharge as needed  - Refer to Case Management Department for coordinating discharge planning if the patient needs post-hospital services based on physician/advanced practitioner order or complex needs related to functional status, cognitive ability, or social support system  Outcome: Progressing     Problem: Knowledge Deficit  Goal: Patient/family/caregiver demonstrates understanding of disease process, treatment plan, medications, and discharge instructions  Description: Complete learning assessment and assess knowledge base.   Interventions:  - Provide teaching at level of understanding  - Provide teaching via preferred learning methods  Outcome: Progressing

## 2023-09-14 NOTE — CASE MANAGEMENT
Case Management Progress Note    Patient name Socrates Lozano  Location L&D 316/L&D 019-11 MRN [id number]  : 2000 Date 2023       LOS (days): 3  Geometric Mean LOS (GMLOS) (days):   Days to GMLOS:        OBJECTIVE:        Current admission status: Inpatient  Preferred Pharmacy:   [address] [place] [address], [address]  Phone: [phone] Fax: [phone]    Primary Care Provider: No primary care provider on file. Primary Insurance: ADDISON ALONZO PENDING  Secondary Insurance:     PROGRESS NOTE:    EDIN confirmed with [facility] at [facility] that they are able to provide a Zomee pump for MOB. EDIN spoke with MOB (interpretor #[id number]) and informed her of same. CM confirmed home address and relayed to [facility] for pump to be shipped to home.

## 2023-09-14 NOTE — PLAN OF CARE

## 2023-09-14 NOTE — NURSING NOTE
With the use of [place]  Brittany Kwans # [id number] Assessment of patient. Reviewed how to care for her bottom and discovered patient did not have dermoplast spray or tucks, same provided and instructed on sequence of use. Also discussed patients difficulty with Gas pain and discovered patient is normally constipated. Discussed medications available to increase her comfort and patient chose what she wanted to try. Reviewed the education videos and postpartum depression questions and answered patients questions.

## 2023-09-18 ENCOUNTER — HOME HEALTH ADMISSION (OUTPATIENT)
Dept: HOME HEALTH SERVICES | Facility: OTHER | Age: 23
End: 2023-09-18

## 2023-09-25 ENCOUNTER — PATIENT OUTREACH (OUTPATIENT)
Dept: OBGYN CLINIC | Facility: CLINIC | Age: 23
End: 2023-09-25

## 2023-09-25 NOTE — PROGRESS NOTES
Chart review indicates that an order was placed 8/9 to follow up with patient for prenatal assessment, 3rd trimester. Notes indicate that patient delivered baby boy on 9/11 at 29 weeks pregnant. Patient discharged home. [facility] outreached patient to follow up and determine if she has any needs. [facility] outreached patient via [employer] ID# [id number]. Mom discharged hospital on 9/14 and in NICU with baby at this time, therefore assessment not that extensive. Baby boy Sari Solo is currently in the NICU at [facility]. Mom states that he is anticipated to dc on Wednesday 9/27. [facility] reviewed hospital CM note with patient and she confirmed all. She relocated here from the  in July 2023 and started prenatal care at [address] She reports she is in the process of completing paperwork for residency. Patient is [place] pending for ins from her hospital stay. [facility] did give her [facility] phone number to follow up for Jessi the future should she qualify. She does have approved SFS case. Patient is currently not working as she is caring for the baby. Her mother helps, but she does not have any additional support. 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 Mom has two other children that reside in the . She denied any ETOH, drug or nicotine use. She denied any MH hx or depression at this time or during pregnancy. She does not have transportation, but her mother gives her rides. She has a car seat and denied need for any other baby supplies. She does note food insecurity. She has spoken with the [facility] office in [facility] and is in the process of getting documentation to the office. She notes difficulty with the baby latching to breast feed and she is pumping and providing milk that way. Supplementing with Similac Neosure. She notes she has enough baby formula now as in NICU and they will provide her with some when he discharges Wednesday. She will then follow up with the [facility] office.  Scanned document in records indicates that patient was approved for the [facility] [employer] program on 9/7. Patient unaware, SWCM explained that they will provide support to her in the home and assist with breast feeding. Patient would like this service. [facility] e-mailed local food pantry list via [employer] to patient at confirmed e-mail [email or web address]. mom expressed no other financial needs at this time. SW called [facility] and spoke with JULIANNE Pathak and they are aware baby is in NICU. RN has been in contact with mom and they are in the process of setting up an appt with mom and baby-possibly on Wednesday at the hospital prior to baby discharging. Mom expressed no other needs at this time and is connected to [facility]. She will call SW if needs arise in the future. Will remain available as needed for assistance.

## 2023-10-03 ENCOUNTER — POSTPARTUM VISIT (OUTPATIENT)
Dept: OBGYN CLINIC | Facility: CLINIC | Age: 23
End: 2023-10-03

## 2023-10-03 VITALS
DIASTOLIC BLOOD PRESSURE: 72 MMHG | SYSTOLIC BLOOD PRESSURE: 120 MMHG | HEART RATE: 92 BPM | BODY MASS INDEX: 31.95 KG/M2 | WEIGHT: 204 LBS

## 2023-10-03 PROBLEM — Z78.9 NOT IMMUNE TO HEPATITIS B VIRUS: Status: RESOLVED | Noted: 2023-08-10 | Resolved: 2023-10-03

## 2023-10-03 PROBLEM — D50.9 MATERNAL IRON DEFICIENCY ANEMIA COMPLICATING PREGNANCY: Status: RESOLVED | Noted: 2023-08-08 | Resolved: 2023-10-03

## 2023-10-03 PROBLEM — O99.019 MATERNAL IRON DEFICIENCY ANEMIA COMPLICATING PREGNANCY: Status: RESOLVED | Noted: 2023-08-08 | Resolved: 2023-10-03

## 2023-10-03 NOTE — PROGRESS NOTES
POSTPARTUM VISIT    Thompson Halsted presents today for postpartum visit. She had a vaginal delivery on 23. Complications included  delivery @34w4d. She is breastpumping and bottlefeeding her infant and reports no issues with such. She desired Nexplanon for contraception and it was inserted 23. She was provided with Rachel Muir Depression Screening tool and her score was 4. Review of Systems:   -Constitutional: denies issues, denies pain   -Breasts: denies tenderness   -Gynecologic: lochia continues - light flow   -Urinary: denies issues urinating   -GI: stools WNL, denies issues    Physical Exam:   -Vitals:   Vitals:    10/03/23 1315   BP: 120/72   Pulse: 92   Weight: 92.5 kg (204 lb)      -General: A&Ox3, no acute distress noted   -Abdomen: soft, non-tender   -Extremities: nontender, no edema noted   -Breasts: deferred   -Pelvic exam: deferred    Assessment/Plan:  1. Normal postpartum exam.  2. Depression screening negative. 3. Advise return for next annual GYN exam in 6 months. 4. Contraception: Nexplanon.

## 2023-10-03 NOTE — PATIENT INSTRUCTIONS
Dong por madsen confianza en nuestro equipo. Sisi Ripple y agradecemos amelie comentarios. Si recibe everett encuesta nuestra, tómese unos momentos para informarnos cómo estamos. Sinceramente,  Yuriy Smyth, CRNP     DESPUÉS DEL PARTO      Debe comunicarse con madsen proveedor de GINECÓLOGO si usted experimenta cualquiera de los siguientes:  1. sangrado que empapa everett almohadilla cada hora scotty 2 horas. 2. mal olor procedente de la vagina. 3. fiebre de 100.4 o superior. 4. incisión o dolor abdominal que no irá lejos a pesar de prescribe medicamentos para el dolor. 5. hinchazón, enrojecimiento, secreción o sangrado de la incisión de cesárea o sitio de desgarro perineal.  6. la incisión se comienza a separar. 7. problemas para orinar incluyendo incapacidad para orinar, ardor al Burke-Gracie o muy oscura de la [place]. 8. no movimiento intestinal dentro de 4 días de orlando a jessica, o la dificultad con las deposiciones después de eso. 9. cualquier tipo de disturbio visual (visión doble, Desenfoque, etcetera). 10. cefalea. 11. gripe-chico síntomas. 12. dolor o enrojecimiento en kaitlin o ambos de amelie pechos. 13. dolor, calor, sensibilidad o hinchazón en las piernas, especialmente la yeimy de la pantorrilla. 14. frecuentes náuseas y vómitos. 15. signos de depresión o ansiedad. 16. Si experimenta wally de pecho o tiene problemas para respirar, llame al 911 inmediatamente. En general puede reanudar el coito sexual después de 6 semanas de la entrega. Es importante continuar cambiando tus toallas sanitarias con frecuencia y limpiar el perineo al menos 2 - 3 veces al día. Mantenga la incisión abdominal después de entrega cesariana limpio y seco en todo momento.

## 2024-04-24 ENCOUNTER — TELEPHONE (OUTPATIENT)
Dept: OBGYN CLINIC | Facility: CLINIC | Age: 24
End: 2024-04-24

## 2025-03-06 ENCOUNTER — HOSPITAL ENCOUNTER (EMERGENCY)
Facility: HOSPITAL | Age: 25
Discharge: HOME | End: 2025-03-06
Attending: EMERGENCY MEDICINE

## 2025-03-06 VITALS
BODY MASS INDEX: 36.29 KG/M2 | WEIGHT: 231.7 LBS | RESPIRATION RATE: 18 BRPM | DIASTOLIC BLOOD PRESSURE: 76 MMHG | TEMPERATURE: 98.2 F | OXYGEN SATURATION: 98 % | SYSTOLIC BLOOD PRESSURE: 132 MMHG | HEART RATE: 91 BPM

## 2025-03-06 DIAGNOSIS — B34.9 VIRAL SYNDROME: Primary | ICD-10-CM

## 2025-03-06 LAB
EXT PREGNANCY TEST URINE: NEGATIVE
EXT. CONTROL: NORMAL

## 2025-03-06 PROCEDURE — 99283 EMERGENCY DEPT VISIT LOW MDM: CPT

## 2025-03-06 PROCEDURE — 81025 URINE PREGNANCY TEST: CPT | Performed by: EMERGENCY MEDICINE

## 2025-03-06 PROCEDURE — 0241U HB NFCT DS VIR RESP RNA 4 TRGT: CPT | Performed by: EMERGENCY MEDICINE

## 2025-03-06 RX ORDER — ALBUTEROL SULFATE 90 UG/1
2 INHALANT RESPIRATORY (INHALATION) ONCE
Status: COMPLETED | OUTPATIENT
Start: 2025-03-06 | End: 2025-03-06

## 2025-03-06 RX ORDER — L-DESOXYEPHEDRINE 50 MG
INHALER (EA) NASAL
Qty: 50 G | Refills: 0 | Status: SHIPPED | OUTPATIENT
Start: 2025-03-06

## 2025-03-06 RX ORDER — BENZONATATE 100 MG/1
100 CAPSULE ORAL 3 TIMES DAILY PRN
Qty: 10 CAPSULE | Refills: 0 | Status: SHIPPED | OUTPATIENT
Start: 2025-03-06

## 2025-03-06 RX ORDER — FLUTICASONE PROPIONATE 50 MCG
1 SPRAY, SUSPENSION (ML) NASAL DAILY
Qty: 16 G | Refills: 0 | Status: SHIPPED | OUTPATIENT
Start: 2025-03-06

## 2025-03-06 RX ORDER — ACETAMINOPHEN 325 MG/1
975 TABLET ORAL ONCE
Status: COMPLETED | OUTPATIENT
Start: 2025-03-06 | End: 2025-03-06

## 2025-03-06 RX ADMIN — DEXAMETHASONE SODIUM PHOSPHATE 10 MG: 10 INJECTION, SOLUTION INTRAMUSCULAR; INTRAVENOUS at 22:42

## 2025-03-06 RX ADMIN — ACETAMINOPHEN 975 MG: 325 TABLET, FILM COATED ORAL at 22:41

## 2025-03-06 RX ADMIN — ALBUTEROL SULFATE 2 PUFF: 90 AEROSOL, METERED RESPIRATORY (INHALATION) at 22:41

## 2025-03-07 LAB
FLUAV RNA RESP QL NAA+PROBE: NEGATIVE
FLUBV RNA RESP QL NAA+PROBE: NEGATIVE
RSV RNA RESP QL NAA+PROBE: NEGATIVE
SARS-COV-2 RNA RESP QL NAA+PROBE: NEGATIVE

## 2025-03-07 NOTE — ED PROVIDER NOTES
Time reflects when diagnosis was documented in both MDM as applicable and the Disposition within this note       Time User Action Codes Description Comment    3/6/2025 10:33 PM Flores Corey [B34.9] Viral syndrome           ED Disposition       ED Disposition   Discharge    Condition   Stable    Date/Time   Thu Mar 6, 2025 10:33 PM    Comment   Digmary RiveraReyes discharge to home/self care.                   Assessment & Plan       Medical Decision Making  Amount and/or Complexity of Data Reviewed  Labs: ordered.    Risk  OTC drugs.  Prescription drug management.      Amount and/or Complexity of Data Reviewed  Clinical lab tests: ordered covid flu rsv   Reviewed past medical records: yes     History Provided by patient    Differential considered covid flu rsv viral uri    Testing sent, symptomatic treatments offered. Well appearing otherwise.  PCP follow up.    The patient was instructed to follow up as documented. Return precautions were provided with the patient and the patient was instructed to return to the emergency department immediately if symptoms worsen. The patient and/or family member acknowledged and was in agreement with the plan.           Medications   dexamethasone oral liquid 10 mg 1 mL (10 mg Oral Given 3/6/25 2242)   albuterol (PROVENTIL HFA,VENTOLIN HFA) inhaler 2 puff (2 puffs Inhalation Given 3/6/25 2241)   acetaminophen (TYLENOL) tablet 975 mg (975 mg Oral Given 3/6/25 2241)       ED Risk Strat Scores                            SBIRT 22yo+      Flowsheet Row Most Recent Value   Initial Alcohol Screen: US AUDIT-C     1. How often do you have a drink containing alcohol? 0 Filed at: 03/06/2025 2221   2. How many drinks containing alcohol do you have on a typical day you are drinking?  0 Filed at: 03/06/2025 2221   3b. FEMALE Any Age, or MALE 65+: How often do you have 4 or more drinks on one occassion? 0 Filed at: 03/06/2025 2221   Audit-C Score 0 Filed at: 03/06/2025 2221   HAZEL: How many  times in the past year have you...    Used an illegal drug or used a prescription medication for non-medical reasons? Never Filed at: 03/06/2025 2221                            History of Present Illness       Chief Complaint   Patient presents with    Asthma     X3 days, feels like she has the flu. Hx of asthma. No inhaler or neb tx. Feels chest tightness.      23 yo F distant hx of asthma, presents to ed for eval of cold symptoms. Ongoing symptoms for the past 3 days. Cough non productive. Meds taken PTA: no. No fevers. Does have congestion. No n/v/cp/sob.  No trouble handling oral secretions. No change in voice. Is not boosted for COVID, not vaccinated for flu. Sick contacts: no      Past Medical History:   Diagnosis Date    Asthma     early childhood      Past Surgical History:   Procedure Laterality Date    NO PAST SURGERIES        Family History   Problem Relation Age of Onset    Breast cancer Neg Hx     Colon cancer Neg Hx     Ovarian cancer Neg Hx     Cancer Neg Hx       Social History     Tobacco Use    Smoking status: Never    Smokeless tobacco: Never   Vaping Use    Vaping status: Never Used   Substance Use Topics    Alcohol use: Not Currently     Comment: couple times/month, none since pregnant    Drug use: Never      E-Cigarette/Vaping    E-Cigarette Use Never User       E-Cigarette/Vaping Substances    Nicotine No     THC No     CBD No     Flavoring No     Other No     Unknown No       I have reviewed and agree with the history as documented.     HPI    Review of Systems   Constitutional:  Negative for chills, fatigue and fever.   HENT:  Positive for congestion. Negative for sore throat.    Eyes:  Negative for redness and visual disturbance.   Respiratory:  Positive for cough. Negative for shortness of breath.    Cardiovascular:  Negative for chest pain.   Gastrointestinal:  Negative for abdominal pain, diarrhea and nausea.   Genitourinary:  Negative for difficulty urinating, dysuria and pelvic pain.    Musculoskeletal:  Negative for back pain.   Skin:  Negative for rash.   Neurological:  Negative for syncope, weakness and headaches.   All other systems reviewed and are negative.          Objective       ED Triage Vitals   Temperature Pulse Blood Pressure Respirations SpO2 Patient Position - Orthostatic VS   03/06/25 2222 03/06/25 2222 03/06/25 2222 03/06/25 2222 03/06/25 2222 03/06/25 2222   98.2 °F (36.8 °C) 91 132/76 18 98 % Sitting      Temp Source Heart Rate Source BP Location FiO2 (%) Pain Score    03/06/25 2222 03/06/25 2222 03/06/25 2222 -- 03/06/25 2241    Oral Monitor Left arm  10 - Worst Possible Pain      Vitals      Date and Time Temp Pulse SpO2 Resp BP Pain Score FACES Pain Rating User   03/06/25 2241 -- -- -- -- -- 10 - Worst Possible Pain -- ST   03/06/25 2222 98.2 °F (36.8 °C) 91 98 % 18 132/76 -- -- ST            Physical Exam  Vitals and nursing note reviewed.   Constitutional:       General: She is not in acute distress.  HENT:      Head: Normocephalic and atraumatic.      Right Ear: External ear normal.      Left Ear: External ear normal.   Eyes:      Extraocular Movements: Extraocular movements intact.      Conjunctiva/sclera: Conjunctivae normal.   Cardiovascular:      Rate and Rhythm: Normal rate and regular rhythm.      Heart sounds: Normal heart sounds.   Pulmonary:      Effort: Pulmonary effort is normal. No respiratory distress.      Breath sounds: Normal breath sounds.   Abdominal:      General: Abdomen is flat.      Tenderness: There is no abdominal tenderness.   Musculoskeletal:         General: Normal range of motion.      Cervical back: Normal range of motion.   Skin:     General: Skin is warm and dry.   Neurological:      Mental Status: She is alert and oriented to person, place, and time.      Cranial Nerves: No cranial nerve deficit.      Motor: No abnormal muscle tone.      Coordination: Coordination normal.         Results Reviewed       Procedure Component Value Units  Date/Time    FLU/RSV/COVID - if FLU/RSV clinically relevant (2hr TAT) [[id number]]  (Normal) Collected: 03/06/25 2231    Lab Status: Final result Specimen: Nares from Nose Updated: 03/07/25 0152     SARS-CoV-2 Negative     INFLUENZA A PCR Negative     INFLUENZA B PCR Negative     RSV PCR Negative    Narrative:      This test has been performed using the CoV-2/Flu/RSV plus assay on the [employer] GeneXpert platform. This test has been validated by the  and verified by the performing laboratory.     This test is designed to amplify and detect the following: nucleocapsid (N), envelope (E), and RNA-dependent RNA polymerase (RdRP) genes of the SARS-CoV-2 genome; matrix (M), basic polymerase (PB2), and acidic protein (PA) segments of the influenza A genome; matrix (M) and non-structural protein (NS) segments of the influenza B genome, and the nucleocapsid genes of RSV A and RSV B.     Positive results are indicative of the presence of Flu A, Flu B, RSV, and/or SARS-CoV-2 RNA. Positive results for SARS-CoV-2 or suspected novel influenza should be reported to state, local, or federal health departments according to local reporting requirements.      All results should be assessed in conjunction with clinical presentation and other laboratory markers for clinical management.     FOR PEDIATRIC PATIENTS - copy/paste COVID Guidelines URL to browser: [email or web address]       POCT pregnancy, urine [[id number]]  (Normal) Collected: 03/06/25 2238    Lab Status: Final result Updated: 03/06/25 2242     EXT Preg Test, Ur Negative     Control Valid            No orders to display       Procedures    ED Medication and Procedure Management   Prior to Admission Medications   Prescriptions Last Dose Informant Patient Reported? Taking?   Prenatal Vit-Fe Fumarate-FA (Prenatal Plus Vitamin/Mineral) 27-1 MG TABS   No No   Sig: Take 1 tablet by mouth in the morning   acetaminophen  (TYLENOL) 325 mg tablet   No No   Sig: Take 2 tablets (650 mg total) by mouth every 6 (six) hours as needed for mild pain, headaches or fever   Patient not taking: Reported on 10/3/2023   docusate sodium (COLACE) 100 mg capsule   No No   Sig: Take 1 capsule (100 mg total) by mouth 2 (two) times a day as needed for constipation for up to 5 days   Patient not taking: Reported on 10/3/2023   etonogestrel (NEXPLANON) subdermal implant   Yes No   Si mg by Subdermal route once   ferrous sulfate 324 (65 Fe) mg   No No   Sig: Take 1 tablet (324 mg total) by mouth daily before breakfast   ibuprofen (MOTRIN) 600 mg tablet   No No   Sig: Take 1 tablet (600 mg total) by mouth every 6 (six) hours      Facility-Administered Medications: None     Discharge Medication List as of 3/6/2025 10:37 PM        START taking these medications    Details   benzonatate (TESSALON PERLES) 100 mg capsule Take 1 capsule (100 mg total) by mouth 3 (three) times a day as needed for cough, Starting Thu 3/6/2025, Normal      Camphor-Eucalyptus-Menthol (Vicks VapoRub) 4.7-1.2-2.6 % OINT Apply topically daily at bedtime as needed (congestion) for up to 1 dose, Starting Thu 3/6/2025, Normal      fluticasone (FLONASE) 50 mcg/act nasal spray 1 spray into each nostril daily, Starting Thu 3/6/2025, Normal           CONTINUE these medications which have NOT CHANGED    Details   acetaminophen (TYLENOL) 325 mg tablet Take 2 tablets (650 mg total) by mouth every 6 (six) hours as needed for mild pain, headaches or fever, Starting 2023, No Print      docusate sodium (COLACE) 100 mg capsule Take 1 capsule (100 mg total) by mouth 2 (two) times a day as needed for constipation for up to 5 days, Starting 2023, Until 2023 at 2359, Normal      etonogestrel (NEXPLANON) subdermal implant 68 mg by Subdermal route once, Historical Med      ferrous sulfate 324 (65 Fe) mg Take 1 tablet (324 mg total) by mouth daily before breakfast, Starting  Tue 8/22/2023, Normal      ibuprofen (MOTRIN) 600 mg tablet Take 1 tablet (600 mg total) by mouth every 6 (six) hours, Starting Thu 9/14/2023, No Print      Prenatal Vit-Fe Fumarate-FA (Prenatal Plus Vitamin/Mineral) 27-1 MG TABS Take 1 tablet by mouth in the morning, Starting Mon 8/7/2023, Normal           No discharge procedures on file.  ED SEPSIS DOCUMENTATION   Time reflects when diagnosis was documented in both MDM as applicable and the Disposition within this note       Time User Action Codes Description Comment    3/6/2025 10:33 PM Flores Corey Add [B34.9] Viral syndrome                  Flores Corey MD  03/07/25 0504